# Patient Record
Sex: FEMALE | Race: WHITE | Employment: OTHER | RURAL
[De-identification: names, ages, dates, MRNs, and addresses within clinical notes are randomized per-mention and may not be internally consistent; named-entity substitution may affect disease eponyms.]

---

## 2017-01-10 ENCOUNTER — TELEPHONE (OUTPATIENT)
Dept: FAMILY MEDICINE CLINIC | Age: 67
End: 2017-01-10

## 2017-06-02 ENCOUNTER — OFFICE VISIT (OUTPATIENT)
Dept: FAMILY MEDICINE CLINIC | Age: 67
End: 2017-06-02

## 2017-06-02 VITALS
WEIGHT: 151.4 LBS | HEART RATE: 57 BPM | SYSTOLIC BLOOD PRESSURE: 102 MMHG | OXYGEN SATURATION: 98 % | TEMPERATURE: 97.5 F | RESPIRATION RATE: 16 BRPM | BODY MASS INDEX: 25 KG/M2 | DIASTOLIC BLOOD PRESSURE: 58 MMHG

## 2017-06-02 DIAGNOSIS — R39.15 URGENCY OF URINATION: ICD-10-CM

## 2017-06-02 DIAGNOSIS — R35.0 URINATION FREQUENCY: ICD-10-CM

## 2017-06-02 DIAGNOSIS — R31.9 URINARY TRACT INFECTION WITH HEMATURIA, SITE UNSPECIFIED: Primary | ICD-10-CM

## 2017-06-02 DIAGNOSIS — N39.0 URINARY TRACT INFECTION WITH HEMATURIA, SITE UNSPECIFIED: Primary | ICD-10-CM

## 2017-06-02 DIAGNOSIS — R30.0 DYSURIA: ICD-10-CM

## 2017-06-02 LAB
BILIRUB UR QL STRIP: NEGATIVE
GLUCOSE UR-MCNC: NEGATIVE MG/DL
KETONES P FAST UR STRIP-MCNC: NEGATIVE MG/DL
PH UR STRIP: 7 [PH] (ref 4.6–8)
PROT UR QL STRIP: NEGATIVE MG/DL
SP GR UR STRIP: 1.01 (ref 1–1.03)
UA UROBILINOGEN AMB POC: NORMAL (ref 0.2–1)
URINALYSIS CLARITY POC: CLEAR
URINALYSIS COLOR POC: NORMAL
URINE BLOOD POC: NORMAL
URINE LEUKOCYTES POC: NORMAL
URINE NITRITES POC: POSITIVE

## 2017-06-02 RX ORDER — SULFAMETHOXAZOLE AND TRIMETHOPRIM 800; 160 MG/1; MG/1
1 TABLET ORAL 2 TIMES DAILY
Qty: 10 TAB | Refills: 0 | Status: SHIPPED | OUTPATIENT
Start: 2017-06-02 | End: 2017-08-24 | Stop reason: SDUPTHER

## 2017-06-02 NOTE — PROGRESS NOTES
Chief Complaint   Patient presents with    Urinary Pain     urinary frequency, pain upon urination     Health Maintenance Due   Topic Date Due    DTaP/Tdap/Td series (1 - Tdap) 12/12/1970    FOBT Q 1 YEAR AGE 50-75  12/12/1999 2015 @ [facility]    ZOSTER VACCINE AGE 60>  12/12/2009 declines    GLAUCOMA SCREENING Q2Y  12/12/2014 sees Dr. Rocio Gutierrez     Blood pressure 102/58, pulse (!) 57, temperature 97.5 °F (36.4 °C), temperature source Temporal, resp. rate 16, weight 151 lb 6.4 oz (68.7 kg), SpO2 98 %.     Yue Horn LPN

## 2017-06-02 NOTE — PATIENT INSTRUCTIONS
Urinary Tract Infection in Women: Care Instructions  Your Care Instructions    A urinary tract infection, or UTI, is a general term for an infection anywhere between the kidneys and the urethra (where urine comes out). Most UTIs are bladder infections. They often cause pain or burning when you urinate. UTIs are caused by bacteria and can be cured with antibiotics. Be sure to complete your treatment so that the infection goes away. Follow-up care is a key part of your treatment and safety. Be sure to make and go to all appointments, and call your doctor if you are having problems. It's also a good idea to know your test results and keep a list of the medicines you take. How can you care for yourself at home? · Take your antibiotics as directed. Do not stop taking them just because you feel better. You need to take the full course of antibiotics. · Drink extra water and other fluids for the next day or two. This may help wash out the bacteria that are causing the infection. (If you have kidney, heart, or liver disease and have to limit fluids, talk with your doctor before you increase your fluid intake.)  · Avoid drinks that are carbonated or have caffeine. They can irritate the bladder. · Urinate often. Try to empty your bladder each time. · To relieve pain, take a hot bath or lay a heating pad set on low over your lower belly or genital area. Never go to sleep with a heating pad in place. To prevent UTIs  · Drink plenty of water each day. This helps you urinate often, which clears bacteria from your system. (If you have kidney, heart, or liver disease and have to limit fluids, talk with your doctor before you increase your fluid intake.)  · Urinate when you need to. · Urinate right after you have sex. · Change sanitary pads often. · Avoid douches, bubble baths, feminine hygiene sprays, and other feminine hygiene products that have deodorants.   · After going to the bathroom, wipe from front to back.  When should you call for help? Call your doctor now or seek immediate medical care if:  · Symptoms such as fever, chills, nausea, or vomiting get worse or appear for the first time. · You have new pain in your back just below your rib cage. This is called flank pain. · There is new blood or pus in your urine. · You have any problems with your antibiotic medicine. Watch closely for changes in your health, and be sure to contact your doctor if:  · You are not getting better after taking an antibiotic for 2 days. · Your symptoms go away but then come back. Where can you learn more? Go to http://darrion-gerson.info/. Enter N129 in the search box to learn more about \"Urinary Tract Infection in Women: Care Instructions. \"  Current as of: November 28, 2016  Content Version: 11.2  © 3916-4236 [employer], [employer] Care instructions adapted under license by [employer] (which disclaims liability or warranty for this information). If you have questions about a medical condition or this instruction, always ask your healthcare professional. [address] any warranty or liability for your use of this information.

## 2017-06-02 NOTE — PROGRESS NOTES
Tony Yañez is a 78 y.o. female who presents to the office today with the following:  Chief Complaint   Patient presents with    Urinary Pain     urinary frequency, pain upon urination       HPI  Concerned she has UTI. Says often gets in summer. Also horse back rides more in warm weather. No pain, \"feels like insides falling out\" and burns during urination. Constant urgency and frequency. No abnl color or odor to urine. Took AZO last night. Drinking more water. ROS  See HPI. Allergies   Allergen Reactions    Codeine Other (comments)     Extreme fatigue, very sleepy    Venom-Honey Bee Unknown (comments)       Current Outpatient Prescriptions   Medication Sig    trimethoprim-sulfamethoxazole (BACTRIM DS, SEPTRA DS) 160-800 mg per tablet Take 1 Tab by mouth two (2) times a day for 5 days.  flintstones complete (FLINTSTONES COMPLETE, IRON,) chewable tablet Take 1 Tab by mouth daily.  calcium-cholecalciferol, d3, (CALCIUM 600 + D) 600-125 mg-unit tab Take  by mouth two (2) times a day.  EPINEPHrine (EPIPEN 2-MIN) 0.3 mg/0.3 mL (1:1,000) injection 0.3 mL by IntraMUSCular route once as needed for up to 1 dose. No current facility-administered medications for this visit.         Past Medical History:   Diagnosis Date    GERD (gastroesophageal reflux disease)     Hiatal hernia     Hyperlipidemia     Hypothyroidism        Past Surgical History:   Procedure Laterality Date    ENDOSCOPY, COLON, DIAGNOSTIC      HX APPENDECTOMY      HX CATARACT REMOVAL  01/2017    right eye    HX COLONOSCOPY  9299,6956    q5y    HX ORTHOPAEDIC Right 2012    Rotator cuff    HX OTHER SURGICAL  1/16    stress echo    HX OTHER SURGICAL  07/08/2016    gastric sleeve    HX PO AND BSO         Social History     Social History    Marital status:      Spouse name: N/A    Number of children: N/A    Years of education: N/A     Social History Main Topics    Smoking status: Former Smoker     Quit date: 3/1/1993    Smokeless tobacco: Never Used    Alcohol use 0.0 oz/week     0 Standard drinks or equivalent per week      Comment: maybe once a month    Drug use: No    Sexual activity: Yes     Partners: Male     Other Topics Concern    None     Social History Narrative       Family History   Problem Relation Age of Onset    Cancer Mother      colon    Cancer Father      lungs and into brain    Hypertension Sister     Kidney Disease Sister     Heart Disease Brother          Physical Exam:  Visit Vitals    /58    Pulse (!) 57    Temp 97.5 °F (36.4 °C) (Temporal)    Resp 16    Wt 151 lb 6.4 oz (68.7 kg)    SpO2 98%    BMI 25 kg/m2     Has had full cardiac work up, admits HR often runs low. Physical Exam   Constitutional: She is oriented to person, place, and time and well-developed, well-nourished, and in no distress. HENT:   Head: Normocephalic and atraumatic. Eyes: Conjunctivae are normal.   Neck: Normal range of motion. Neck supple. Cardiovascular: Normal rate, regular rhythm and normal heart sounds. Pulmonary/Chest: Effort normal and breath sounds normal.   Abdominal: Soft. Normal appearance. She exhibits no distension and no mass. There is no hepatosplenomegaly. There is no tenderness. There is no rigidity, no rebound, no guarding, no CVA tenderness and no tenderness at McBurney's point. Lymphadenopathy:     She has no cervical adenopathy. Neurological: She is alert and oriented to person, place, and time. Gait normal.   Skin: Skin is warm and dry. Psychiatric: Mood and affect normal.   Nursing note and vitals reviewed. Assessment/Plan:    ICD-10-CM ICD-9-CM    1. Urinary tract infection with hematuria, site unspecified N39.0 599.0 trimethoprim-sulfamethoxazole (BACTRIM DS, SEPTRA DS) 160-800 mg per tablet    R31.9  CULTURE, URINE   2. Urgency of urination R39.15 788.63 AMB POC URINALYSIS DIP STICK AUTO W/O MICRO   3. Urination frequency R35.0 788.41    4.  Dysuria R30.0 788. 1      Results for orders placed or performed in visit on 06/02/17   AMB POC URINALYSIS DIP STICK AUTO W/O MICRO   Result Value Ref Range    Color (UA POC) Pavithra     Clarity (UA POC) Clear     Glucose (UA POC) Negative Negative    Bilirubin (UA POC) Negative Negative    Ketones (UA POC) Negative Negative    Specific gravity (UA POC) 1.015 1.001 - 1.035    Blood (UA POC) 1+ Negative    pH (UA POC) 7 4.6 - 8.0    Protein (UA POC) Negative Negative mg/dL    Urobilinogen (UA POC) 0.2 mg/dL 0.2 - 1    Nitrites (UA POC) Positive Negative    Leukocyte esterase (UA POC) 2+ Negative   Rec repeat in 2 weeks. Plans to f/u with PCP Dr. Nichole Zamarripa anyway- due for medicare wellness and had a few other chronic conditions to discuss. Instructed to push fluids (ie water/cranberry juice). Avoid caffeine and carbonated bvgs. Counseled on otc medications for sxs relief. Educational handout provided with home care instructions. Instructed to RTO/seek medical attn if sxs persist/worsen. Follow-up Disposition:  Return if symptoms worsen or fail to improve.     Mikey Pinto PA-C

## 2017-06-05 NOTE — PROGRESS NOTES
Notify pt urine culture confirms e.coli and susceptibility to the abx she was presribed, so should work well. Please continue tx plan and have rtc if sxs persist/worsen, if improved still rec repeat UA with Dr. Evelyn Castillo at f/u in couple weeks to ensure urine nl.

## 2017-06-26 ENCOUNTER — OFFICE VISIT (OUTPATIENT)
Dept: FAMILY MEDICINE CLINIC | Age: 67
End: 2017-06-26

## 2017-06-26 VITALS
DIASTOLIC BLOOD PRESSURE: 59 MMHG | RESPIRATION RATE: 18 BRPM | BODY MASS INDEX: 25 KG/M2 | OXYGEN SATURATION: 99 % | TEMPERATURE: 96 F | SYSTOLIC BLOOD PRESSURE: 96 MMHG | WEIGHT: 151.4 LBS | HEART RATE: 58 BPM

## 2017-06-26 DIAGNOSIS — Z13.31 SCREENING FOR DEPRESSION: ICD-10-CM

## 2017-06-26 DIAGNOSIS — N30.01 ACUTE CYSTITIS WITH HEMATURIA: ICD-10-CM

## 2017-06-26 DIAGNOSIS — Z00.00 MEDICARE ANNUAL WELLNESS VISIT, SUBSEQUENT: Primary | ICD-10-CM

## 2017-06-26 LAB
BILIRUB UR QL STRIP: NEGATIVE
GLUCOSE UR-MCNC: NEGATIVE MG/DL
KETONES P FAST UR STRIP-MCNC: NEGATIVE MG/DL
PH UR STRIP: 7 [PH] (ref 4.6–8)
PROT UR QL STRIP: NEGATIVE MG/DL
SP GR UR STRIP: 1.02 (ref 1–1.03)
UA UROBILINOGEN AMB POC: NORMAL (ref 0.2–1)
URINALYSIS CLARITY POC: CLEAR
URINALYSIS COLOR POC: NORMAL
URINE BLOOD POC: NEGATIVE
URINE LEUKOCYTES POC: NORMAL
URINE NITRITES POC: NEGATIVE

## 2017-06-26 NOTE — PATIENT INSTRUCTIONS
Schedule of Personalized Health Plan  (Provide Copy to Patient)  The best way to stay healthy is to live a healthy lifestyle. A healthy lifestyle includes regular exercise, eating a well-balanced diet, keeping a healthy weight and not smoking. Regular physical exams and screening tests are another important way to take care of yourself. Preventive exams provided by health care providers can find health problems early when treatment works best and can keep you from getting certain diseases or illnesses. Preventive services include exams, lab tests, screenings, shots, monitoring and information to help you take care of your own health. All people over 65 should have a pneumonia shot. Pneumonia shots are usually only needed once in a lifetime unless your doctor decides differently. All people over 65 should have a yearly flu shot. People over 65 are at medium to high risk for Hepatitis B. Three shots are needed for complete protection. In addition to your physical exam, some screening tests are recommended:    Bone mass measurement (dexa scan) is recommended every two years  Diabetes Mellitus screening is recommended every year. Glaucoma is an eye disease caused by high pressure in the eye. An eye exam is recommended every year. Cardiovascular screening tests that check your cholesterol and other blood fat (lipid) levels are recommended every five years. Colorectal Cancer screening tests help to find pre-cancerous polyps (growths in the colon) so they can be removed before they turn into cancer. Tests ordered for screening depend on your personal and family history risk factors.     Screening for Breast Cancer is recommended yearly with a mammogram.    Screening for Cervical Cancer is recommended every two years (annually for certain risk factors, such as previous history of STD or abnormal PAP in past 7 years), with a Pelvic Exam with PAP    Here is a list of your current Health Maintenance items with a due date:  Health Maintenance   Topic Date Due    DTaP/Tdap/Td series (1 - Tdap) 12/12/1970    FOBT Q 1 YEAR AGE 50-75  12/12/1999    ZOSTER VACCINE AGE 60>  12/12/2009    GLAUCOMA SCREENING Q2Y  12/12/2014    MEDICARE YEARLY EXAM  06/04/2017    INFLUENZA AGE 9 TO ADULT  08/01/2017    BREAST CANCER SCRN MAMMOGRAM  03/07/2018    Hepatitis C Screening  Completed    OSTEOPOROSIS SCREENING (DEXA)  Completed    Pneumococcal 65+ Low/Medium Risk  Completed

## 2017-06-26 NOTE — PROGRESS NOTES
Chief Complaint   Patient presents with   [facility] Wellness Visit     [address] 56 is a 78 y.o. female and presents for annual Medicare Wellness Visit. Problem List: Reviewed with patient and discussed risk factors. Patient Active Problem List   Diagnosis Code    Hyperlipidemia E78.5    Hypothyroidism E03.9    Hiatal hernia K44.9    GERD (gastroesophageal reflux disease) K21.9    Advance directive discussed with patient Z71.89       Current medical providers:  Patient Care Team:  Jim Mirza MD as PCP - General (Family Practice)    PSH: Reviewed with patient  Past Surgical History:   Procedure Laterality Date    ENDOSCOPY, COLON, DIAGNOSTIC      HX APPENDECTOMY      HX CATARACT REMOVAL  01/2017    right eye    HX COLONOSCOPY  4001,8289    q5y    HX ORTHOPAEDIC Right 2012    Rotator cuff    HX OTHER SURGICAL  1/16    stress echo    HX OTHER SURGICAL  07/08/2016    gastric sleeve    HX PO AND BSO          SH: Reviewed with patient  Social History   Substance Use Topics    Smoking status: Former Smoker     Quit date: 3/1/1993    Smokeless tobacco: Never Used    Alcohol use 0.0 oz/week     0 Standard drinks or equivalent per week      Comment: maybe once a month       FH: Reviewed with patient  Family History   Problem Relation Age of Onset    Cancer Mother      colon    Cancer Father      lungs and into brain    Hypertension Sister     Kidney Disease Sister     Heart Disease Brother     Cancer Son        Medications/Allergies: Reviewed with patient  Current Outpatient Prescriptions on File Prior to Visit   Medication Sig Dispense Refill    flintstones complete (FLINTSTONES COMPLETE, IRON,) chewable tablet Take 1 Tab by mouth daily.  calcium-cholecalciferol, d3, (CALCIUM 600 + D) 600-125 mg-unit tab Take  by mouth two (2) times a day.       EPINEPHrine (EPIPEN 2-MIN) 0.3 mg/0.3 mL (1:1,000) injection 0.3 mL by IntraMUSCular route once as needed for up to 1 dose. 1 Each prn    trimethoprim-sulfamethoxazole (BACTRIM DS, SEPTRA DS) 160-800 mg per tablet Take 1 Tab by mouth two (2) times a day for 5 days. 10 Tab 0     No current facility-administered medications on file prior to visit. Allergies   Allergen Reactions    Codeine Other (comments)     Extreme fatigue, very sleepy    Venom-Honey Bee Unknown (comments)       Objective:  Visit Vitals    BP 96/59 (BP 1 Location: Left arm, BP Patient Position: Sitting)    Pulse (!) 58    Temp 96 °F (35.6 °C) (Oral)    Resp 18    Wt 151 lb 6.4 oz (68.7 kg)    SpO2 99%    BMI 25 kg/m2    Body mass index is 25 kg/(m^2). Assessment of cognitive impairment: Alert and oriented x 3    Depression Screen:   PHQ over the last two weeks 6/26/2017   Little interest or pleasure in doing things Not at all   Feeling down, depressed or hopeless Not at all   Total Score PHQ 2 0       Fall Risk Assessment:    Fall Risk Assessment, last 12 mths 6/26/2017   Able to walk? Yes   Fall in past 12 months? No       Functional Ability:   Does the patient exhibit a steady gait? yes   How long did it take the patient to get up and walk from a sitting position? 2sec   Is the patient self reliant?  (ie can do own laundry, meals, household chores)  yes     Does the patient handle his/her own medications? yes     Does the patient handle his/her own money? yes     Is the patients home safe (ie good lighting, handrails on stairs and bath, etc.)? yes     Did you notice or did patient express any hearing difficulties? yes     Did you notice or did patient express any vision difficulties?   no     Were distance and reading eye charts used? no       Advance Care Planning:   Patient was offered the opportunity to discuss advance care planning:  yes     Does patient have an Advance Directive:  no   If no, did you provide information on Caring Connections?   yes       Plan:      No orders of the defined types were placed in this encounter. Health Maintenance   Topic Date Due    DTaP/Tdap/Td series (1 - Tdap) 12/12/1970    FOBT Q 1 YEAR AGE 50-75  12/12/1999 carmen donald every 5 years    ZOSTER VACCINE AGE 60>  12/12/2009    GLAUCOMA SCREENING Q2Y  12/12/2014 sees Dr. Michelle Anderson  06/04/2017 today    INFLUENZA AGE 9 TO ADULT  08/01/2017    BREAST CANCER SCRN MAMMOGRAM  03/07/2018    Hepatitis C Screening  Completed    OSTEOPOROSIS SCREENING (DEXA)  Completed    Pneumococcal 65+ Low/Medium Risk  Completed       *Patient verbalized understanding and agreement with the plan. A copy of the After Visit Summary with personalized health plan was given to the patient today.       Cierra Colbert LPN

## 2017-06-26 NOTE — PROGRESS NOTES
HISTORY OF PRESENT ILLNESS  Abhijit Henriquez is a 78 y.o. female. Chief Complaint   Patient presents with   [facility] Annual Wellness Visit            HPI  Had been on Celexa for Depression  Came off all meds after surgery  occ anxious when on road or get on a horse    HM reviewed    Review of Systems   Constitutional: Negative for weight loss. HENT: Negative for congestion. Eyes: Negative for blurred vision. Respiratory: Negative for cough. Cardiovascular: Negative for chest pain, palpitations and leg swelling. Gastrointestinal: Negative for abdominal pain, blood in stool and melena. Genitourinary: Negative for dysuria, frequency, hematuria and urgency. Musculoskeletal: Negative for joint pain and myalgias. Skin: Negative. Neurological: Negative for dizziness and headaches. Endo/Heme/Allergies: Negative for polydipsia. Does not bruise/bleed easily. Psychiatric/Behavioral: Negative for depression. The patient is nervous/anxious. Past Medical History:   Diagnosis Date    GERD (gastroesophageal reflux disease)     Hiatal hernia     resolved    Hyperlipidemia     resolved    Hypothyroidism     resolved     Current Outpatient Prescriptions   Medication Sig Dispense Refill    flintstones complete (FLINTSTONES COMPLETE, IRON,) chewable tablet Take 1 Tab by mouth daily.  calcium-cholecalciferol, d3, (CALCIUM 600 + D) 600-125 mg-unit tab Take  by mouth two (2) times a day.  EPINEPHrine (EPIPEN 2-MIN) 0.3 mg/0.3 mL (1:1,000) injection 0.3 mL by IntraMUSCular route once as needed for up to 1 dose. 1 Each prn    trimethoprim-sulfamethoxazole (BACTRIM DS, SEPTRA DS) 160-800 mg per tablet Take 1 Tab by mouth two (2) times a day for 5 days.  10 Tab 0     Allergies   Allergen Reactions    Codeine Other (comments)     Extreme fatigue, very sleepy    Venom-Honey Bee Unknown (comments)     Visit Vitals    BP 96/59 (BP 1 Location: Left arm, BP Patient Position: Sitting)    Pulse (!) 58  Temp 96 °F (35.6 °C) (Oral)    Resp 18    Wt 151 lb 6.4 oz (68.7 kg)    SpO2 99%    BMI 25 kg/m2     Physical Exam   Constitutional: She is oriented to person, place, and time. She appears well-developed and well-nourished. No distress. HENT:   Head: Normocephalic and atraumatic. Right Ear: External ear normal.   Left Ear: External ear normal.   Mouth/Throat: No oropharyngeal exudate. Eyes: Conjunctivae and EOM are normal. Pupils are equal, round, and reactive to light. Cardiovascular: Normal rate, regular rhythm and normal heart sounds. Pulmonary/Chest: Effort normal and breath sounds normal.   Abdominal: Soft. She exhibits no distension. There is no tenderness. Musculoskeletal: She exhibits no edema. Lymphadenopathy:     She has no cervical adenopathy. Neurological: She is alert and oriented to person, place, and time. Skin: Skin is warm and dry. Psychiatric: She has a normal mood and affect. Nursing note and vitals reviewed. Recent Results (from the past 12 hour(s))   AMB POC URINALYSIS DIP STICK MANUAL W/O MICRO    Collection Time: 06/26/17  9:40 AM   Result Value Ref Range    Color (UA POC) Dark Yellow     Clarity (UA POC) Clear     Glucose (UA POC) Negative Negative    Bilirubin (UA POC) Negative Negative    Ketones (UA POC) Negative Negative    Specific gravity (UA POC) 1.020 1.001 - 1.035    Blood (UA POC) Negative Negative    pH (UA POC) 7.0 4.6 - 8.0    Protein (UA POC) Negative Negative mg/dL    Urobilinogen (UA POC) 0.2 mg/dL 0.2 - 1    Nitrites (UA POC) Negative Negative    Leukocyte esterase (UA POC) Trace Negative       ASSESSMENT and PLAN    ICD-10-CM ICD-9-CM    1. Medicare annual wellness visit, subsequent Z00.00 V70.0    2. Screening for depression Z13.89 V79.0 [place] DEPRESSION SCREEN ANNUAL   3.  Acute cystitis with hematuria N30.01 595.0 AMB POC URINALYSIS DIP STICK MANUAL W/O MICRO  Hematuria resolved

## 2017-06-26 NOTE — MR AVS SNAPSHOT
Visit Information Date & Time Provider Department Dept. Phone Encounter #  
 6/26/2017  8:20 AM Arthur Cortes  [facility] [phone] [id number] Your Appointments 7/10/2018  8:00 AM  
Medicare Physical with Arthur Cortes MD  
[address] [facility] ([facility]) Appt Note: ,CP$0/6.26.2017  
 [address] [phone]  
[phone]  
  
   
 [address] [address] Upcoming Health Maintenance Date Due COLONOSCOPY 12/12/1967 GLAUCOMA SCREENING Q2Y 12/12/2014 MEDICARE YEARLY EXAM 6/4/2017 INFLUENZA AGE 9 TO ADULT 8/1/2017 BREAST CANCER SCRN MAMMOGRAM 3/7/2018 DTaP/Tdap/Td series (2 - Td) 6/26/2027 Allergies as of 6/26/2017  Review Complete On: 6/26/2017 By: Renny Belcher LPN Severity Noted Reaction Type Reactions Codeine  06/03/2016    Other (comments) Extreme fatigue, very sleepy Venom-honey Bee  08/20/2013   Not Verified Unknown (comments) Current Immunizations  Reviewed on 12/27/2016 Name Date Influenza High Dose Vaccine PF 12/27/2016 Influenza Vaccine 10/14/2014, 1/8/2014 Pneumococcal Conjugate (PCV-13) 6/3/2016 Pneumococcal Polysaccharide (PPSV-23) 5/11/2015 Tetanus Toxoid, Adsorbed 5/11/2015 Not reviewed this visit You Were Diagnosed With   
  
 Codes Comments Medicare annual wellness visit, subsequent    -  Primary ICD-10-CM: Z00.00 ICD-9-CM: V70.0 Screening for depression     ICD-10-CM: Z13.89 ICD-9-CM: V79.0 Acute cystitis with hematuria     ICD-10-CM: N30.01 
ICD-9-CM: 595.0 Vitals BP Pulse Temp Resp Weight(growth percentile) SpO2  
 96/59 (BP 1 Location: Left arm, BP Patient Position: Sitting) (!) 58 96 °F (35.6 °C) (Oral) 18 151 lb 6.4 oz (68.7 kg) 99% BMI OB Status Smoking Status 25 kg/m2 Hysterectomy Former Smoker BMI and BSA Data Body Mass Index Body Surface Area  
 25 kg/m 2 1.78 m 2 Preferred Pharmacy Pharmacy Name Phone [facility], [address] [place] [address] Your Updated Medication List  
  
   
This list is accurate as of: 6/26/17  9:55 AM.  Always use your most recent med list.  
  
  
  
  
 CALCIUM 600 + D 600-125 mg-unit Tab Generic drug:  calcium-cholecalciferol (d3) Take  by mouth two (2) times a day. EPINEPHrine 0.3 mg/0.3 mL injection Commonly known as:  EPIPEN 2-MIN  
0.3 mL by IntraMUSCular route once as needed for up to 1 dose. FLINTSTONES COMPLETE (IRON) chewable tablet Generic drug:  flintstones complete Take 1 Tab by mouth daily. trimethoprim-sulfamethoxazole 160-800 mg per tablet Commonly known as:  BACTRIM DS, SEPTRA DS Take 1 Tab by mouth two (2) times a day for 5 days. We Performed the Following AMB POC URINALYSIS DIP STICK MANUAL W/O MICRO [13153 CPT(R)] [id number] [place] [address] [employer] [ [facility]] Patient Instructions Schedule of Personalized Health Plan (Provide Copy to Patient) The best way to stay healthy is to live a healthy lifestyle. A healthy lifestyle includes regular exercise, eating a well-balanced diet, keeping a healthy weight and not smoking. Regular physical exams and screening tests are another important way to take care of yourself. Preventive exams provided by health care providers can find health problems early when treatment works best and can keep you from getting certain diseases or illnesses. Preventive services include exams, lab tests, screenings, shots, monitoring and information to help you take care of your own health. All people over 65 should have a pneumonia shot. Pneumonia shots are usually only needed once in a lifetime unless your doctor decides differently. All people over 65 should have a yearly flu shot. People over 65 are at medium to high risk for Hepatitis B. Three shots are needed for complete protection. In addition to your physical exam, some screening tests are recommended: 
 
Bone mass measurement (dexa scan) is recommended every two years Diabetes Mellitus screening is recommended every year. Glaucoma is an eye disease caused by high pressure in the eye. An eye exam is recommended every year. Cardiovascular screening tests that check your cholesterol and other blood fat (lipid) levels are recommended every five years. Colorectal Cancer screening tests help to find pre-cancerous polyps (growths in the colon) so they can be removed before they turn into cancer. Tests ordered for screening depend on your personal and family history risk factors. Screening for Breast Cancer is recommended yearly with a mammogram. 
 
Screening for Cervical Cancer is recommended every two years (annually for certain risk factors, such as previous history of STD or abnormal PAP in past 7 years), with a Pelvic Exam with PAP Here is a list of your current Health Maintenance items with a due date: 
Health Maintenance Topic Date Due  
 DTaP/Tdap/Td series (1 - Tdap) 12/12/1970  
 FOBT Q 1 YEAR AGE 50-75  12/12/1999  ZOSTER VACCINE AGE 60>  12/12/2009  GLAUCOMA SCREENING Q2Y  12/12/2014  MEDICARE YEARLY EXAM  06/04/2017  INFLUENZA AGE 9 TO ADULT  08/01/2017  BREAST CANCER SCRN MAMMOGRAM  03/07/2018  Hepatitis C Screening  Completed  OSTEOPOROSIS SCREENING (DEXA)  Completed  Pneumococcal 65+ Low/Medium Risk  Completed Introducing [facility]! King Marija introduces [employer] patient portal. Now you can access parts of your medical record, email your doctor's office, and request medication refills online. 1. In your internet browser, go to [email or web address] 2. Click on the First Time User? Click Here link in the Sign In box.  You will see the New Member Sign Up page. 3. Enter your [employer] Access Code exactly as it appears below. You will not need to use this code after youve completed the sign-up process. If you do not sign up before the expiration date, you must request a new code. · [employer] Access Code: [email or web address] Expires: 8/31/2017 10:59 AM 
 
4. Enter the last four digits of your Social Security Number (xxxx) and Date of Birth (mm/dd/yyyy) as indicated and click Submit. You will be taken to the next sign-up page. 5. Create a [employer] ID. This will be your [employer] login ID and cannot be changed, so think of one that is secure and easy to remember. 6. Create a [employer] password. You can change your password at any time. 7. Enter your Password Reset Question and Answer. This can be used at a later time if you forget your password. 8. Enter your e-mail address. You will receive e-mail notification when new information is available in [address] 9. Click Sign Up. You can now view and download portions of your medical record. 10. Click the Download Summary menu link to download a portable copy of your medical information. If you have questions, please visit the Frequently Asked Questions section of the [employer] website. Remember, [employer] is NOT to be used for urgent needs. For medical emergencies, dial 911. Now available from your iPhone and Android! Please provide this summary of care documentation to your next provider. Your primary care clinician is listed as Makeda Garay. If you have any questions after today's visit, please call [phone].

## 2017-06-26 NOTE — ACP (ADVANCE CARE PLANNING)
Advance Care Planning (ACP) Provider Conversation Snapshot    Date of ACP Conversation: 06/26/17  Persons included in Conversation:  patient  Length of ACP Conversation in minutes:  <16 minutes (Non-Billable)    HO given and pt will review and bring in a copy

## 2017-07-13 LAB — BACTERIA UR CULT: ABNORMAL

## 2017-08-24 ENCOUNTER — OFFICE VISIT (OUTPATIENT)
Dept: FAMILY MEDICINE CLINIC | Age: 67
End: 2017-08-24

## 2017-08-24 VITALS
SYSTOLIC BLOOD PRESSURE: 104 MMHG | TEMPERATURE: 97 F | OXYGEN SATURATION: 96 % | DIASTOLIC BLOOD PRESSURE: 58 MMHG | WEIGHT: 154 LBS | RESPIRATION RATE: 16 BRPM | HEART RATE: 55 BPM | BODY MASS INDEX: 25.66 KG/M2 | HEIGHT: 65 IN

## 2017-08-24 DIAGNOSIS — S69.92XA FINGER INJURY, LEFT, INITIAL ENCOUNTER: ICD-10-CM

## 2017-08-24 DIAGNOSIS — N30.01 ACUTE CYSTITIS WITH HEMATURIA: Primary | ICD-10-CM

## 2017-08-24 DIAGNOSIS — R30.0 DYSURIA: ICD-10-CM

## 2017-08-24 LAB
BILIRUB UR QL STRIP: NEGATIVE
GLUCOSE UR-MCNC: NEGATIVE MG/DL
KETONES P FAST UR STRIP-MCNC: NEGATIVE MG/DL
PH UR STRIP: 6.5 [PH] (ref 4.6–8)
PROT UR QL STRIP: NORMAL MG/DL
SP GR UR STRIP: 1.02 (ref 1–1.03)
UA UROBILINOGEN AMB POC: NORMAL (ref 0.2–1)
URINALYSIS CLARITY POC: NORMAL
URINALYSIS COLOR POC: YELLOW
URINE BLOOD POC: NORMAL
URINE LEUKOCYTES POC: NORMAL
URINE NITRITES POC: POSITIVE

## 2017-08-24 RX ORDER — SULFAMETHOXAZOLE AND TRIMETHOPRIM 800; 160 MG/1; MG/1
1 TABLET ORAL 2 TIMES DAILY
Qty: 10 TAB | Refills: 0 | Status: SHIPPED | OUTPATIENT
Start: 2017-08-24 | End: 2018-06-27 | Stop reason: SDUPTHER

## 2017-08-24 NOTE — PROGRESS NOTES
HISTORY OF PRESENT ILLNESS  Hayley Mccormack is a 78 y.o. female. Chief Complaint   Patient presents with    Dysuria     urine frequency       HPI  Started 2 d ago  Drawing feeling with urination  Frequency and urgency  Tried AZO  Helped a little    Stubbed left ring finger 2 d ago  And swollen  Can move finger  Pain over prox middle phalanx    Review of Systems   Constitutional: Negative for fever. Gastrointestinal: Negative for abdominal pain. Genitourinary: Positive for dysuria, frequency and urgency. Negative for flank pain and hematuria. Musculoskeletal: Negative for back pain. Past Medical History:   Diagnosis Date    GERD (gastroesophageal reflux disease)     Hiatal hernia     resolved    Hyperlipidemia     resolved    Hypothyroidism     resolved     Current Outpatient Prescriptions   Medication Sig Dispense Refill    trimethoprim-sulfamethoxazole (BACTRIM DS, SEPTRA DS) 160-800 mg per tablet Take 1 Tab by mouth two (2) times a day for 5 days. 10 Tab 0    flintstones complete (FLINTSTONES COMPLETE, IRON,) chewable tablet Take 1 Tab by mouth daily.  calcium-cholecalciferol, d3, (CALCIUM 600 + D) 600-125 mg-unit tab Take  by mouth two (2) times a day.  EPINEPHrine (EPIPEN 2-MIN) 0.3 mg/0.3 mL (1:1,000) injection 0.3 mL by IntraMUSCular route once as needed for up to 1 dose. 1 Each prn     Allergies   Allergen Reactions    Codeine Other (comments)     Extreme fatigue, very sleepy    Venom-Honey Bee Unknown (comments)     Visit Vitals    /58 (BP 1 Location: Right arm, BP Patient Position: Sitting)    Pulse (!) 55    Temp 97 °F (36.1 °C) (Oral)    Resp 16    Ht 5' 5.25\" (1.657 m)    Wt 154 lb (69.9 kg)    SpO2 96%    BMI 25.43 kg/m2     Physical Exam   Constitutional: She is oriented to person, place, and time. She appears well-developed and well-nourished. No distress. HENT:   Head: Normocephalic and atraumatic.    Eyes: Conjunctivae and EOM are normal. Cardiovascular: Normal rate and regular rhythm. Pulmonary/Chest: Effort normal and breath sounds normal.   Abdominal: Soft. Bowel sounds are normal.   Musculoskeletal: Normal range of motion. She exhibits no edema. Left ring finger swollen over proximal phalanx, mildly tender over prox middle phalanx, ring in place   Neurological: She is alert and oriented to person, place, and time. Skin: Skin is warm and dry. Psychiatric: She has a normal mood and affect. Nursing note and vitals reviewed. Recent Results (from the past 12 hour(s))   AMB POC URINALYSIS DIP STICK MANUAL W/O MICRO    Collection Time: 08/24/17  8:23 AM   Result Value Ref Range    Color (UA POC) Yellow     Clarity (UA POC) Cloudy     Glucose (UA POC) Negative Negative    Bilirubin (UA POC) Negative Negative    Ketones (UA POC) Negative Negative    Specific gravity (UA POC) 1.020 1.001 - 1.035    Blood (UA POC) 2+ Negative    pH (UA POC) 6.5 4.6 - 8.0    Protein (UA POC) 2+ Negative mg/dL    Urobilinogen (UA POC) 0.2 mg/dL 0.2 - 1    Nitrites (UA POC) Positive Negative    Leukocyte esterase (UA POC) 3+ Negative       ASSESSMENT and PLAN    ICD-10-CM ICD-9-CM    1. Acute cystitis with hematuria N30.01 595.0 CULTURE, URINE      trimethoprim-sulfamethoxazole (BACTRIM DS, SEPTRA DS) 160-800 mg per tablet   2. Dysuria R30.0 788.1 AMB POC URINALYSIS DIP STICK MANUAL W/O MICRO   3. Finger injury, left, initial encounter S69. 92XA 959.5 XR 4TH FINGER LT MIN 2 V   advised her to cool finger and try to get ring off or cut it off

## 2017-08-24 NOTE — PROGRESS NOTES
1. Have you been to the ER, urgent care clinic since your last visit? Hospitalized since your last visit? Saw [facility] provider first part of August,2017.    2. Have you seen or consulted any other health care providers outside of the [address] [place] [address] since your last visit? Include any pap smears or colon screening.  No

## 2017-08-27 LAB — BACTERIA UR CULT: ABNORMAL

## 2017-08-28 NOTE — PROGRESS NOTES
I have called and advised patient of lab results per Dr Billy Lo review. Patient verbalizes understanding.   Onesimo Pickering RN

## 2018-02-23 ENCOUNTER — OFFICE VISIT (OUTPATIENT)
Dept: FAMILY MEDICINE CLINIC | Age: 68
End: 2018-02-23

## 2018-02-23 VITALS
HEART RATE: 60 BPM | DIASTOLIC BLOOD PRESSURE: 59 MMHG | WEIGHT: 163.4 LBS | RESPIRATION RATE: 16 BRPM | OXYGEN SATURATION: 98 % | SYSTOLIC BLOOD PRESSURE: 102 MMHG | TEMPERATURE: 96 F | BODY MASS INDEX: 26.98 KG/M2

## 2018-02-23 DIAGNOSIS — F33.8 SEASONAL DEPRESSION (HCC): ICD-10-CM

## 2018-02-23 DIAGNOSIS — F41.9 ANXIETY: Primary | ICD-10-CM

## 2018-02-23 RX ORDER — CITALOPRAM 20 MG/1
20 TABLET, FILM COATED ORAL DAILY
Qty: 90 TAB | Refills: 1 | Status: SHIPPED | OUTPATIENT
Start: 2018-02-23 | End: 2018-07-26 | Stop reason: SDUPTHER

## 2018-02-23 RX ORDER — ACETAMINOPHEN 500 MG
TABLET ORAL 2 TIMES DAILY
COMMUNITY
End: 2018-06-27 | Stop reason: ALTCHOICE

## 2018-02-23 NOTE — PROGRESS NOTES
Chief Complaint   Patient presents with    Anxiety     pt has been off of celexa for 2 years, pt. c/o having panick attacks/anxiety approximately 6 months; pt wants to discuss medication     Health Maintenance Due   Topic Date Due    Influenza Age 5 to Adult  08/01/2017    BREAST CANCER SCRN MAMMOGRAM  03/07/2018     Visit Vitals    /59 (BP 1 Location: Left arm, BP Patient Position: Sitting)    Pulse 60    Temp 96 °F (35.6 °C) (Oral)    Resp 16    Wt 163 lb 6.4 oz (74.1 kg)    SpO2 98%    BMI 26.98 kg/m2     Umesh Murrieta LPN

## 2018-02-23 NOTE — PROGRESS NOTES
HISTORY OF PRESENT ILLNESS  Gerry Weller is a 75 y.o. female. Chief Complaint   Patient presents with    Anxiety     pt has been off of celexa for 2 years, pt. c/o having panick attacks/anxiety approximately 6 months; pt wants to discuss medication       HPI  Before surgery was on Celexa 20  Then came off and was doing well  In fall was getting more anxious  Worse with driving and riding  Once doing it was ok  1 mo ago worse  Anxious about going to new places  Tight up in knots when has to get going  Sleeps ok  Gained some weight  Eating comfort foods    No SE with Celexa  Helping in the past    Review of Systems   HENT: Negative for congestion. Respiratory: Negative for cough. Cardiovascular: Negative for chest pain and palpitations. Neurological: Negative for dizziness and headaches. Psychiatric/Behavioral: Positive for depression. Negative for hallucinations, substance abuse and suicidal ideas. The patient is nervous/anxious. The patient does not have insomnia. Past Medical History:   Diagnosis Date    GERD (gastroesophageal reflux disease)     Hiatal hernia     resolved    Hyperlipidemia     resolved    Hypothyroidism     resolved     Current Outpatient Prescriptions   Medication Sig Dispense Refill    Cholecalciferol, Vitamin D3, (VITAMIN D3) 2,000 unit cap capsule Take  by mouth two (2) times a day.  citalopram (CELEXA) 20 mg tablet Take 1 Tab by mouth daily. 90 Tab 1    flintstones complete (FLINTSTONES COMPLETE, IRON,) chewable tablet Take 1 Tab by mouth daily.  calcium-cholecalciferol, d3, (CALCIUM 600 + D) 600-125 mg-unit tab Take  by mouth two (2) times a day.  EPINEPHrine (EPIPEN 2-MIN) 0.3 mg/0.3 mL (1:1,000) injection 0.3 mL by IntraMUSCular route once as needed for up to 1 dose. 1 Each prn    trimethoprim-sulfamethoxazole (BACTRIM DS, SEPTRA DS) 160-800 mg per tablet Take 1 Tab by mouth two (2) times a day for 5 days.  10 Tab 0     Allergies   Allergen Reactions    Venom-Honey Bee Swelling and Unknown (comments)    Codeine Other (comments)     Extreme fatigue, very sleepy     Visit Vitals    /59 (BP 1 Location: Left arm, BP Patient Position: Sitting)    Pulse 60    Temp 96 °F (35.6 °C) (Oral)    Resp 16    Wt 163 lb 6.4 oz (74.1 kg)    SpO2 98%    BMI 26.98 kg/m2     Physical Exam   Constitutional: She is oriented to person, place, and time. She appears well-developed and well-nourished. No distress. HENT:   Head: Normocephalic and atraumatic. Right Ear: External ear normal.   Left Ear: External ear normal.   Mouth/Throat: Oropharynx is clear and moist. No oropharyngeal exudate. Eyes: Conjunctivae and EOM are normal. Pupils are equal, round, and reactive to light. Cardiovascular: Normal rate and regular rhythm. Pulmonary/Chest: Effort normal and breath sounds normal.   Musculoskeletal: She exhibits no edema. Lymphadenopathy:     She has no cervical adenopathy. Neurological: She is alert and oriented to person, place, and time. Skin: Skin is warm and dry. Psychiatric: She has a normal mood and affect. Nursing note and vitals reviewed. ASSESSMENT and PLAN    ICD-10-CM ICD-9-CM    1. Anxiety F41.9 300.00 citalopram (CELEXA) 20 mg tablet      METABOLIC PANEL, COMPREHENSIVE      CBC WITH AUTOMATED DIFF      TSH 3RD GENERATION   2.  Seasonal depression (HCC) F33.9 296.99 citalopram (CELEXA) 20 mg tablet      METABOLIC PANEL, COMPREHENSIVE      CBC WITH AUTOMATED DIFF      TSH 3RD GENERATION

## 2018-02-24 LAB
ALBUMIN SERPL-MCNC: 4 G/DL (ref 3.6–4.8)
ALBUMIN/GLOB SERPL: 1.5 {RATIO} (ref 1.2–2.2)
ALP SERPL-CCNC: 83 IU/L (ref 39–117)
ALT SERPL-CCNC: 18 IU/L (ref 0–32)
AST SERPL-CCNC: 22 IU/L (ref 0–40)
BASOPHILS # BLD AUTO: 0 X10E3/UL (ref 0–0.2)
BASOPHILS NFR BLD AUTO: 1 %
BILIRUB SERPL-MCNC: <0.2 MG/DL (ref 0–1.2)
BUN SERPL-MCNC: 21 MG/DL (ref 8–27)
BUN/CREAT SERPL: 24 (ref 12–28)
CALCIUM SERPL-MCNC: 9 MG/DL (ref 8.7–10.3)
CHLORIDE SERPL-SCNC: 98 MMOL/L (ref 96–106)
CO2 SERPL-SCNC: 26 MMOL/L (ref 18–29)
CREAT SERPL-MCNC: 0.88 MG/DL (ref 0.57–1)
EOSINOPHIL # BLD AUTO: 0.2 X10E3/UL (ref 0–0.4)
EOSINOPHIL NFR BLD AUTO: 3 %
ERYTHROCYTE [DISTWIDTH] IN BLOOD BY AUTOMATED COUNT: 13.7 % (ref 12.3–15.4)
GFR SERPLBLD CREATININE-BSD FMLA CKD-EPI: 68 ML/MIN/1.73
GFR SERPLBLD CREATININE-BSD FMLA CKD-EPI: 78 ML/MIN/1.73
GLOBULIN SER CALC-MCNC: 2.6 G/DL (ref 1.5–4.5)
GLUCOSE SERPL-MCNC: 69 MG/DL (ref 65–99)
HCT VFR BLD AUTO: 38.6 % (ref 34–46.6)
HGB BLD-MCNC: 13 G/DL (ref 11.1–15.9)
IMM GRANULOCYTES # BLD: 0 X10E3/UL (ref 0–0.1)
IMM GRANULOCYTES NFR BLD: 0 %
LYMPHOCYTES # BLD AUTO: 2.4 X10E3/UL (ref 0.7–3.1)
LYMPHOCYTES NFR BLD AUTO: 33 %
MCH RBC QN AUTO: 30.2 PG (ref 26.6–33)
MCHC RBC AUTO-ENTMCNC: 33.7 G/DL (ref 31.5–35.7)
MCV RBC AUTO: 90 FL (ref 79–97)
MONOCYTES # BLD AUTO: 0.6 X10E3/UL (ref 0.1–0.9)
MONOCYTES NFR BLD AUTO: 8 %
NEUTROPHILS # BLD AUTO: 4 X10E3/UL (ref 1.4–7)
NEUTROPHILS NFR BLD AUTO: 55 %
PLATELET # BLD AUTO: 270 X10E3/UL (ref 150–379)
POTASSIUM SERPL-SCNC: 4.1 MMOL/L (ref 3.5–5.2)
PROT SERPL-MCNC: 6.6 G/DL (ref 6–8.5)
RBC # BLD AUTO: 4.3 X10E6/UL (ref 3.77–5.28)
SODIUM SERPL-SCNC: 140 MMOL/L (ref 134–144)
TSH SERPL DL<=0.005 MIU/L-ACNC: 2.34 UIU/ML (ref 0.45–4.5)
WBC # BLD AUTO: 7.2 X10E3/UL (ref 3.4–10.8)

## 2018-02-26 NOTE — PROGRESS NOTES
Call pt, the sugar, kidney and liver tests are normal  The CBC is normal  The thyroid test is normal

## 2018-06-27 ENCOUNTER — OFFICE VISIT (OUTPATIENT)
Dept: FAMILY MEDICINE CLINIC | Age: 68
End: 2018-06-27

## 2018-06-27 VITALS
HEIGHT: 65 IN | DIASTOLIC BLOOD PRESSURE: 56 MMHG | RESPIRATION RATE: 16 BRPM | HEART RATE: 56 BPM | TEMPERATURE: 96.9 F | WEIGHT: 166.2 LBS | SYSTOLIC BLOOD PRESSURE: 107 MMHG | OXYGEN SATURATION: 98 % | BODY MASS INDEX: 27.69 KG/M2

## 2018-06-27 DIAGNOSIS — N30.00 ACUTE CYSTITIS WITHOUT HEMATURIA: ICD-10-CM

## 2018-06-27 DIAGNOSIS — R30.9 URINARY PAIN: Primary | ICD-10-CM

## 2018-06-27 LAB
BILIRUB UR QL STRIP: NEGATIVE
GLUCOSE UR-MCNC: NEGATIVE MG/DL
KETONES P FAST UR STRIP-MCNC: NEGATIVE MG/DL
PH UR STRIP: 5.5 [PH] (ref 4.6–8)
PROT UR QL STRIP: NEGATIVE
SP GR UR STRIP: 1.01 (ref 1–1.03)
UA UROBILINOGEN AMB POC: NORMAL (ref 0.2–1)
URINALYSIS CLARITY POC: NORMAL
URINALYSIS COLOR POC: YELLOW
URINE BLOOD POC: NORMAL
URINE LEUKOCYTES POC: NORMAL
URINE NITRITES POC: NEGATIVE

## 2018-06-27 RX ORDER — SULFAMETHOXAZOLE AND TRIMETHOPRIM 800; 160 MG/1; MG/1
1 TABLET ORAL 2 TIMES DAILY
Qty: 10 TAB | Refills: 0 | Status: SHIPPED | OUTPATIENT
Start: 2018-06-27 | End: 2019-08-12 | Stop reason: DRUGHIGH

## 2018-06-27 NOTE — MR AVS SNAPSHOT
[facility] 6 
[phone] Patient: Prem Patel MRN: [id number] Visit Information Date & Time Provider Department Dept. Phone Encounter #  
 2018  2:40 PM Keely Fontenot MD [address] [place] [address] [phone] [id number] Follow-up Instructions Return if symptoms worsen or fail to improve. Your Appointments 7/10/2018  8:00 AM  
Medicare Physical with Krystyna Nicolas MD  
[address] [place] [address] (Kathy Erasmo) Appt Note: ,CP$0/2017  
 [address] [phone]  
[phone]  
  
   
 [address] [address] Upcoming Health Maintenance Date Due  
 BREAST CANCER SCRN MAMMOGRAM 3/7/2018 MEDICARE YEARLY EXAM 2018 Influenza Age 5 to Adult 2018 GLAUCOMA SCREENING Q2Y 2019 COLONOSCOPY 11/10/2019 DTaP/Tdap/Td series (2 - Td) 2027 Allergies as of 2018  Review Complete On: 2018 By: Ofelia Sr RN Severity Noted Reaction Type Reactions Venom-honey Bee High 2013   Not Verified Swelling, Unknown (comments) Codeine  2016    Other (comments) Extreme fatigue, very sleepy Current Immunizations  Reviewed on 2016 Name Date Influenza High Dose Vaccine PF 2016 Influenza Vaccine 10/14/2014, 2014 Pneumococcal Conjugate (PCV-13) 6/3/2016 Pneumococcal Polysaccharide (PPSV-23) 2015 Tetanus Toxoid, Adsorbed 2015 Not reviewed this visit You Were Diagnosed With   
  
 Codes Comments Urinary pain    -  Primary ICD-10-CM: R30.9 ICD-9-CM: 788.1 Urinary tract infection with hematuria, site unspecified     ICD-10-CM: N39.0, R31.9 ICD-9-CM: 599.0 Acute cystitis with hematuria     ICD-10-CM: N30.01 
ICD-9-CM: 595.0 Vitals BP Pulse Temp Resp Height(growth percentile) Weight(growth percentile) 107/56 (BP 1 Location: Left arm, BP Patient Position: Sitting) (!) 56 96.9 °F (36.1 °C) (Oral) 16 5' 5\" (1.651 m) 166 lb 3.2 oz (75.4 kg) SpO2 BMI OB Status Smoking Status 98% 27.66 kg/m2 Hysterectomy Former Smoker BMI and BSA Data Body Mass Index Body Surface Area  
 27.66 kg/m 2 1.86 m 2 Preferred Pharmacy Pharmacy Name Phone [facility] [address], [address] [place] [address] Your Updated Medication List  
  
   
This list is accurate as of 6/27/18  3:22 PM.  Always use your most recent med list.  
  
  
  
  
 CALCIUM 600 + D 600-125 mg-unit Tab Generic drug:  calcium-cholecalciferol (d3) Take  by mouth two (2) times a day. citalopram 20 mg tablet Commonly known as:  Cloyce Maria Take 1 Tab by mouth daily. EPINEPHrine 0.3 mg/0.3 mL injection Commonly known as:  EPIPEN 2-MIN  
0.3 mL by IntraMUSCular route once as needed for up to 1 dose. FLINTSTONES COMPLETE (IRON) chewable tablet Generic drug:  flintstones complete Take 1 Tab by mouth daily. trimethoprim-sulfamethoxazole 160-800 mg per tablet Commonly known as:  BACTRIM DS, SEPTRA DS Take 1 Tab by mouth two (2) times a day for 5 days. Prescriptions Sent to Pharmacy Refills  
 trimethoprim-sulfamethoxazole (BACTRIM DS, SEPTRA DS) 160-800 mg per tablet 0 Sig: Take 1 Tab by mouth two (2) times a day for 5 days. Class: Normal  
 Pharmacy: [facility] [address], [address] [place] [address] [address] Ph #: [phone] Route: Oral  
  
We Performed the Following AMB POC URINALYSIS DIP STICK MANUAL W/O MICRO [95100 CPT(R)] CULTURE, URINE [id number] CPT(R)] Follow-up Instructions Return if symptoms worsen or fail to improve. Patient Instructions Finish antibiotics Await culture result Drink lots of fluids Do not hold urine Follow-up if worse or not better next 1-2 days Introducing [facility]! Dari Pruitt introduces [employer] patient portal. Now you can access parts of your medical record, email your doctor's office, and request medication refills online. 1. In your internet browser, go to [email or web address] 2. Click on the First Time User? Click Here link in the Sign In box. You will see the New Member Sign Up page. 3. Enter your [employer] Access Code exactly as it appears below. You will not need to use this code after youve completed the sign-up process. If you do not sign up before the expiration date, you must request a new code. · [employer] Access Code: [email or web address] Expires: 9/25/2018  3:21 PM 
 
4. Enter the last four digits of your Social Security Number (xxxx) and Date of Birth (mm/dd/yyyy) as indicated and click Submit. You will be taken to the next sign-up page. 5. Create a [employer] ID. This will be your [employer] login ID and cannot be changed, so think of one that is secure and easy to remember. 6. Create a [employer] password. You can change your password at any time. 7. Enter your Password Reset Question and Answer. This can be used at a later time if you forget your password. 8. Enter your e-mail address. You will receive e-mail notification when new information is available in [address] 9. Click Sign Up. You can now view and download portions of your medical record. 10. Click the Download Summary menu link to download a portable copy of your medical information. If you have questions, please visit the Frequently Asked Questions section of the [employer] website. Remember, [employer] is NOT to be used for urgent needs. For medical emergencies, dial 911. Now available from your iPhone and Android! Please provide this summary of care documentation to your next provider. Your primary care clinician is listed as Makeda Garay.  If you have any questions after today's visit, please call [phone].

## 2018-06-27 NOTE — PROGRESS NOTES
Hayley Mccormack is a 75 y.o. female who presents to the office today with the following:  Chief Complaint   Patient presents with    Urinary Pain     has pain when she urinates       Allergies   Allergen Reactions    Venom-Honey Bee Swelling and Unknown (comments)    Codeine Other (comments)     Extreme fatigue, very sleepy       Current Outpatient Prescriptions   Medication Sig    trimethoprim-sulfamethoxazole (BACTRIM DS, SEPTRA DS) 160-800 mg per tablet Take 1 Tab by mouth two (2) times a day for 5 days.  citalopram (CELEXA) 20 mg tablet Take 1 Tab by mouth daily.  flintstones complete (FLINTSTONES COMPLETE, IRON,) chewable tablet Take 1 Tab by mouth daily.  calcium-cholecalciferol, d3, (CALCIUM 600 + D) 600-125 mg-unit tab Take  by mouth two (2) times a day.  EPINEPHrine (EPIPEN 2-MIN) 0.3 mg/0.3 mL (1:1,000) injection 0.3 mL by IntraMUSCular route once as needed for up to 1 dose. No current facility-administered medications for this visit.         Past Medical History:   Diagnosis Date    GERD (gastroesophageal reflux disease)     Hiatal hernia     resolved    Hyperlipidemia     resolved    Hypothyroidism     resolved       Past Surgical History:   Procedure Laterality Date    ENDOSCOPY, COLON, DIAGNOSTIC      HX APPENDECTOMY      HX CATARACT REMOVAL  01/2017    right eye    HX COLONOSCOPY  2398,1513    q5y    HX ORTHOPAEDIC Right 2012    Rotator cuff    HX OTHER SURGICAL  1/16    stress echo    HX OTHER SURGICAL  07/08/2016    gastric sleeve    HX PO AND BSO         History   Smoking Status    Former Smoker    Quit date: 3/1/1993   Smokeless Tobacco    Never Used       Family History   Problem Relation Age of Onset    Cancer Mother      colon    Cancer Father      lungs and into brain    Hypertension Sister     Kidney Disease Sister     Heart Disease Brother     Cancer Son          History of Present Illness:  Patient here for evaluation urinary symptoms    Patient with 1 day history urinary urgency frequency and some dysuria. No fever no chills no back pain no abdominal pain. Symptoms exactly like her previous UTIs. She has not had one now in over a year. She previously responded well to a 5 day course of Bactrim      Review of Systems:    Review of systems negative except as noted above      Physical Exam:  Visit Vitals    /56 (BP 1 Location: Left arm, BP Patient Position: Sitting)    Pulse (!) 56    Temp 96.9 °F (36.1 °C) (Oral)    Resp 16    Ht 5' 5\" (1.651 m)    Wt 166 lb 3.2 oz (75.4 kg)    SpO2 98%    BMI 27.66 kg/m2     Vitals:    06/27/18 1458   BP: 107/56   BP 1 Location: Left arm   BP Patient Position: Sitting   Pulse: (!) 56   Resp: 16   Temp: 96.9 °F (36.1 °C)   TempSrc: Oral   SpO2: 98%   Weight: 166 lb 3.2 oz (75.4 kg)   Height: 5' 5\" (1.651 m)       Patient was in no acute distress. Vital signs as above. Afebrile  Chest was clear no wheezes rhonchi or rales  Sinus bradycardia which is her baseline no S3 no S4 no murmur  Abdomen soft nontender. No suprapubic tenderness  No CVA tenderness  Urinalysis positive for leukocytes and blood  Assessment/Plan:  1. Urinary pain    - AMB POC URINALYSIS DIP STICK MANUAL W/O MICRO    2. Acute cystitis without hematuria  Symptoms and exam consistent with UTI. Will treat patient with Bactrim. Methods to prevent recurrent UTIs discussed with patient. She will follow-up if her symptoms persist  - trimethoprim-sulfamethoxazole (BACTRIM DS, SEPTRA DS) 160-800 mg per tablet; Take 1 Tab by mouth two (2) times a day for 5 days. Dispense: 10 Tab; Refill: 0  - CULTURE, URINE    Patient Instructions   Finish antibiotics  Await culture result  Drink lots of fluids  Do not hold urine  Follow-up if worse or not better next 1-2 days            Continue current therapy plan except for indicated above.  Verbal and written instructions (see AVS) provided.  Patient expresses understanding of diagnosis and treatment plan.    Follow-up Disposition:  Return if symptoms worsen or fail to improve. Hanna Patches.  Juan Rivera MD

## 2018-06-27 NOTE — PATIENT INSTRUCTIONS
Finish antibiotics  Await culture result  Drink lots of fluids  Do not hold urine  Follow-up if worse or not better next 1-2 days

## 2018-06-29 LAB — BACTERIA UR CULT: ABNORMAL

## 2018-07-26 ENCOUNTER — OFFICE VISIT (OUTPATIENT)
Dept: FAMILY MEDICINE CLINIC | Age: 68
End: 2018-07-26

## 2018-07-26 VITALS
HEIGHT: 65 IN | OXYGEN SATURATION: 97 % | SYSTOLIC BLOOD PRESSURE: 95 MMHG | BODY MASS INDEX: 27.52 KG/M2 | DIASTOLIC BLOOD PRESSURE: 60 MMHG | HEART RATE: 58 BPM | WEIGHT: 165.2 LBS | RESPIRATION RATE: 14 BRPM | TEMPERATURE: 99.2 F

## 2018-07-26 DIAGNOSIS — Z12.39 BREAST CANCER SCREENING: ICD-10-CM

## 2018-07-26 DIAGNOSIS — Z00.00 MEDICARE ANNUAL WELLNESS VISIT, SUBSEQUENT: Primary | ICD-10-CM

## 2018-07-26 DIAGNOSIS — E66.3 OVERWEIGHT (BMI 25.0-29.9): ICD-10-CM

## 2018-07-26 DIAGNOSIS — F41.9 ANXIETY: ICD-10-CM

## 2018-07-26 RX ORDER — CITALOPRAM 20 MG/1
20 TABLET, FILM COATED ORAL DAILY
Qty: 90 TAB | Refills: 1 | Status: SHIPPED | OUTPATIENT
Start: 2018-07-26 | End: 2019-06-10 | Stop reason: SDUPTHER

## 2018-07-26 NOTE — PROGRESS NOTES
Chief Complaint   Patient presents with   [facility] Wellness Visit     [address] Tavo Garcia 56 is a 75 y.o. female and presents for annual Medicare Wellness Visit. Problem List: Reviewed with patient and discussed risk factors. Patient Active Problem List   Diagnosis Code    Hyperlipidemia E78.5    Hypothyroidism E03.9    Hiatal hernia K44.9    GERD (gastroesophageal reflux disease) K21.9    Advance directive discussed with patient Z71.89       Current medical providers:  Patient Care Team:  Jaskaran Melendez MD as PCP - General (Family Practice)    PSH: Reviewed with patient  Past Surgical History:   Procedure Laterality Date    ENDOSCOPY, COLON, DIAGNOSTIC      HX APPENDECTOMY      HX CATARACT REMOVAL  01/2017    right eye    HX COLONOSCOPY  9783,0253    q5y    HX ORTHOPAEDIC Right 2012    Rotator cuff    HX OTHER SURGICAL  1/16    stress echo    HX OTHER SURGICAL  07/08/2016    gastric sleeve    HX PO AND BSO          SH: Reviewed with patient  Social History   Substance Use Topics    Smoking status: Former Smoker     Packs/day: 1.00     Years: 20.00     Quit date: 3/1/1993    Smokeless tobacco: Never Used    Alcohol use 0.0 oz/week     0 Standard drinks or equivalent per week      Comment: maybe once a month       FH: Reviewed with patient  Family History   Problem Relation Age of Onset    Cancer Mother      colon    Cancer Father      lungs and into brain    Hypertension Sister     Kidney Disease Sister     Heart Disease Brother     Cancer Son        Medications/Allergies: Reviewed with patient  Current Outpatient Prescriptions on File Prior to Visit   Medication Sig Dispense Refill    citalopram (CELEXA) 20 mg tablet Take 1 Tab by mouth daily. 90 Tab 1    flintstones complete (FLINTSTONES COMPLETE, IRON,) chewable tablet Take 1 Tab by mouth daily.  calcium-cholecalciferol, d3, (CALCIUM 600 + D) 600-125 mg-unit tab Take  by mouth two (2) times a day.       EPINEPHrine (EPIPEN 2-MIN) 0.3 mg/0.3 mL (1:1,000) injection 0.3 mL by IntraMUSCular route once as needed for up to 1 dose. 1 Each prn    trimethoprim-sulfamethoxazole (BACTRIM DS, SEPTRA DS) 160-800 mg per tablet Take 1 Tab by mouth two (2) times a day for 5 days. 10 Tab 0     No current facility-administered medications on file prior to visit. Allergies   Allergen Reactions    Venom-Honey Bee Swelling and Unknown (comments)    Codeine Other (comments)     Extreme fatigue, very sleepy       Objective:  Visit Vitals    BP 95/60 (BP 1 Location: Left arm, BP Patient Position: Sitting)    Pulse (!) 58    Temp 99.2 °F (37.3 °C) (Oral)    Resp 14    Ht 5' 5\" (1.651 m)    Wt 165 lb 3.2 oz (74.9 kg)    SpO2 97%    BMI 27.49 kg/m2    Body mass index is 27.49 kg/(m^2). Assessment of cognitive impairment: Alert and oriented x 3    Depression Screen:   PHQ over the last two weeks 7/26/2018   Little interest or pleasure in doing things Not at all   Feeling down, depressed, irritable, or hopeless Not at all   Total Score PHQ 2 0       Fall Risk Assessment:    Fall Risk Assessment, last 12 mths 7/26/2018   Able to walk? Yes   Fall in past 12 months? No       Functional Ability:   Does the patient exhibit a steady gait? yes   How long did it take the patient to get up and walk from a sitting position? 3sec   Is the patient self reliant?  (ie can do own laundry, meals, household chores)  yes     Does the patient handle his/her own medications? yes     Does the patient handle his/her own money? yes     Is the patients home safe (ie good lighting, handrails on stairs and bath, etc.)? yes     Did you notice or did patient express any hearing difficulties? no     Did you notice or did patient express any vision difficulties?   no     Were distance and reading eye charts used?   no       Advance Care Planning:   Patient was offered the opportunity to discuss advance care planning:  yes     Does patient have an Advance Directive:  no   If no, did you provide information on Caring Connections? yes       Plan:      No orders of the defined types were placed in this encounter. Health Maintenance   Topic Date Due    BREAST CANCER SCRN MAMMOGRAM  03/07/2018    MEDICARE YEARLY EXAM  06/27/2018    Influenza Age 9 to Adult  08/01/2018    GLAUCOMA SCREENING Q2Y  02/22/2019    COLONOSCOPY  11/10/2019    DTaP/Tdap/Td series (2 - Td) 06/26/2027    Hepatitis C Screening  Completed    Bone Densitometry (Dexa) Screening  Completed    ZOSTER VACCINE AGE 60>  Addressed    Pneumococcal 65+ Low/Medium Risk  Completed       *Patient verbalized understanding and agreement with the plan. A copy of the After Visit Summary with personalized health plan was given to the patient today. 1. Have you been to the ER, urgent care clinic since your last visit? Hospitalized since your last visit? No    2. Have you seen or consulted any other health care providers outside of the [address] [place] [address] since your last visit? Include any pap smears or colon screening.  No    Kellen Gilbert LPN

## 2018-07-26 NOTE — PROGRESS NOTES
HISTORY OF PRESENT ILLNESS  Salma iSlva is a 75 y.o. female. Chief Complaint   Patient presents with   [facility] Annual Wellness Visit            HPI  HM reviewed  Functioning well    Anxiety  Much better on Celexa  No SE  Doing better    Overweight  Had sleeve   Weight creeping  No formal exercises but active    Review of Systems   Constitutional: Negative for weight loss. HENT: Negative for congestion and hearing loss. Eyes: Negative for blurred vision. Respiratory: Negative for cough. Cardiovascular: Negative for chest pain. Gastrointestinal: Negative for abdominal pain, blood in stool and melena. Genitourinary: Negative for frequency and hematuria. Musculoskeletal: Negative for falls, joint pain and myalgias. Skin: Negative. Neurological: Negative for dizziness and headaches. Endo/Heme/Allergies: Negative for polydipsia. Psychiatric/Behavioral: The patient is not nervous/anxious. Past Medical History:   Diagnosis Date    Anxiety     Depression     GERD (gastroesophageal reflux disease)     Hiatal hernia     resolved    Hyperlipidemia     resolved    Hypothyroidism     resolved       Past Surgical History:   Procedure Laterality Date    ENDOSCOPY, COLON, DIAGNOSTIC      HX APPENDECTOMY      HX CATARACT REMOVAL  01/2017    right eye    HX COLONOSCOPY  4349,2805    q5y    HX ORTHOPAEDIC Right 2012    Rotator cuff    HX OTHER SURGICAL  1/16    stress echo    HX OTHER SURGICAL  07/08/2016    gastric sleeve    HX PO AND BSO         Current Outpatient Prescriptions   Medication Sig Dispense Refill    citalopram (CELEXA) 20 mg tablet Take 1 Tab by mouth daily. 90 Tab 1    flintstones complete (FLINTSTONES COMPLETE, IRON,) chewable tablet Take 1 Tab by mouth daily.  calcium-cholecalciferol, d3, (CALCIUM 600 + D) 600-125 mg-unit tab Take  by mouth two (2) times a day.       EPINEPHrine (EPIPEN 2-MIN) 0.3 mg/0.3 mL (1:1,000) injection 0.3 mL by IntraMUSCular route once as needed for up to 1 dose. 1 Each prn    trimethoprim-sulfamethoxazole (BACTRIM DS, SEPTRA DS) 160-800 mg per tablet Take 1 Tab by mouth two (2) times a day for 5 days. 10 Tab 0       Allergies   Allergen Reactions    Venom-Honey Bee Swelling and Unknown (comments)    Codeine Other (comments)     Extreme fatigue, very sleepy       Visit Vitals    BP 95/60 (BP 1 Location: Left arm, BP Patient Position: Sitting)    Pulse (!) 58    Temp 99.2 °F (37.3 °C) (Oral)    Resp 14    Ht 5' 5\" (1.651 m)    Wt 165 lb 3.2 oz (74.9 kg)    SpO2 97%    BMI 27.49 kg/m2     Physical Exam   Constitutional: She is oriented to person, place, and time. She appears well-developed and well-nourished. No distress. HENT:   Head: Normocephalic and atraumatic. Right Ear: External ear normal.   Left Ear: External ear normal.   Mouth/Throat: Oropharynx is clear and moist. No oropharyngeal exudate. Eyes: Conjunctivae and EOM are normal.   Cardiovascular: Normal rate, regular rhythm, normal heart sounds and intact distal pulses. Pulmonary/Chest: Effort normal and breath sounds normal.   Abdominal: Soft. Bowel sounds are normal.   Musculoskeletal: She exhibits no edema. Lymphadenopathy:     She has no cervical adenopathy. Neurological: She is alert and oriented to person, place, and time. Skin: Skin is warm and dry. Psychiatric: She has a normal mood and affect. Nursing note and vitals reviewed. ASSESSMENT and PLAN    ICD-10-CM ICD-9-CM    1. Medicare annual wellness visit, subsequent Z00.00 V70.0    2. Breast cancer screening Z12.31 V76.10 [facility] 3D ABRAM W MAMMO BI SCREENING INCL CAD   3. Anxiety F41.9 300.00 citalopram (CELEXA) 20 mg tablet   4. Overweight (BMI 25.0-29. 9) E66.3 278.02    Diet, exercise and weight loss discussed.

## 2018-07-26 NOTE — PATIENT INSTRUCTIONS
Schedule of Personalized Health Plan  (Provide Copy to Patient)  The best way to stay healthy is to live a healthy lifestyle. A healthy lifestyle includes regular exercise, eating a well-balanced diet, keeping a healthy weight and not smoking. Regular physical exams and screening tests are another important way to take care of yourself. Preventive exams provided by health care providers can find health problems early when treatment works best and can keep you from getting certain diseases or illnesses. Preventive services include exams, lab tests, screenings, shots, monitoring and information to help you take care of your own health. All people over 65 should have a pneumonia shot. Pneumonia shots are usually only needed once in a lifetime unless your doctor decides differently. All people over 65 should have a yearly flu shot. People over 65 are at medium to high risk for Hepatitis B. Three shots are needed for complete protection. In addition to your physical exam, some screening tests are recommended:    Bone mass measurement (dexa scan) is recommended every two years  Diabetes Mellitus screening is recommended every year. Glaucoma is an eye disease caused by high pressure in the eye. An eye exam is recommended every year. Cardiovascular screening tests that check your cholesterol and other blood fat (lipid) levels are recommended every five years. Colorectal Cancer screening tests help to find pre-cancerous polyps (growths in the colon) so they can be removed before they turn into cancer. Tests ordered for screening depend on your personal and family history risk factors.     Screening for Breast Cancer is recommended yearly with a mammogram.    Screening for Cervical Cancer is recommended every two years (annually for certain risk factors, such as previous history of STD or abnormal PAP in past 7 years), with a Pelvic Exam with PAP    Here is a list of your current Health Maintenance items with a due date:  Health Maintenance   Topic Date Due    BREAST CANCER SCRN MAMMOGRAM  03/07/2018    MEDICARE YEARLY EXAM  06/27/2018    Influenza Age 9 to Adult  08/01/2018    GLAUCOMA SCREENING Q2Y  02/22/2019    COLONOSCOPY  11/10/2019    DTaP/Tdap/Td series (2 - Td) 06/26/2027    Hepatitis C Screening  Completed    Bone Densitometry (Dexa) Screening  Completed    ZOSTER VACCINE AGE 60>  Addressed    Pneumococcal 65+ Low/Medium Risk  Completed

## 2018-07-26 NOTE — ACP (ADVANCE CARE PLANNING)
Advance Care Planning (ACP) Provider Conversation Snapshot    Date of ACP Conversation: 07/26/18  Persons included in Conversation:  patient  Length of ACP Conversation in minutes:  <16 minutes (Non-Billable)    HO given and pt will review and bring back a copy

## 2018-08-20 ENCOUNTER — OFFICE VISIT (OUTPATIENT)
Dept: FAMILY MEDICINE CLINIC | Age: 68
End: 2018-08-20

## 2018-08-20 VITALS
SYSTOLIC BLOOD PRESSURE: 117 MMHG | OXYGEN SATURATION: 98 % | HEIGHT: 65 IN | RESPIRATION RATE: 20 BRPM | WEIGHT: 165 LBS | BODY MASS INDEX: 27.49 KG/M2 | TEMPERATURE: 97.8 F | DIASTOLIC BLOOD PRESSURE: 67 MMHG | HEART RATE: 68 BPM

## 2018-08-20 DIAGNOSIS — N30.01 ACUTE CYSTITIS WITH HEMATURIA: Primary | ICD-10-CM

## 2018-08-20 LAB
BILIRUB UR QL STRIP: NEGATIVE
GLUCOSE UR-MCNC: NEGATIVE MG/DL
KETONES P FAST UR STRIP-MCNC: NEGATIVE MG/DL
PH UR STRIP: 5.5 [PH] (ref 4.6–8)
PROT UR QL STRIP: NEGATIVE
SP GR UR STRIP: 1.02 (ref 1–1.03)
UA UROBILINOGEN AMB POC: NORMAL (ref 0.2–1)
URINALYSIS CLARITY POC: CLEAR
URINALYSIS COLOR POC: YELLOW
URINE BLOOD POC: NORMAL
URINE LEUKOCYTES POC: NORMAL
URINE NITRITES POC: NEGATIVE

## 2018-08-20 RX ORDER — CEPHALEXIN 500 MG/1
500 CAPSULE ORAL 2 TIMES DAILY
Qty: 14 CAP | Refills: 0 | Status: SHIPPED | OUTPATIENT
Start: 2018-08-20 | End: 2018-08-27

## 2018-08-20 NOTE — PROGRESS NOTES
Nicki Joseph is a 75 y.o. female who presents to the office today with the following:  Chief Complaint   Patient presents with    Urinary Frequency       HPI  Urgency and frequency since Sat morning. Pain \"like pulling\" up into lower abd when urinates, no burning. Noticed no blood, fever/chills, or back pain. Also no n/v/d. Tried some AZO. Helped get through weekend. Drinking a lot of water. H/o UTIs. Tends to get in summer when sweats a lot. Rides horses. If does not change out of wet clothes soon enough gets UTI. Did do this recently. Otherwise feeling well with no other complaints or acute concerns. Review of Systems   Constitutional: Negative for chills, fever and malaise/fatigue. Gastrointestinal: Negative for diarrhea, nausea and vomiting. Genitourinary: Positive for dysuria, frequency and urgency. Negative for flank pain and hematuria. Musculoskeletal: Negative for myalgias. Skin: Negative for rash. See HPI. Past Medical History:   Diagnosis Date    Anxiety     Depression     GERD (gastroesophageal reflux disease)     Hiatal hernia     resolved    Hyperlipidemia     resolved    Hypothyroidism     resolved       Past Surgical History:   Procedure Laterality Date    ENDOSCOPY, COLON, DIAGNOSTIC      HX APPENDECTOMY      HX CATARACT REMOVAL  01/2017    right eye    HX COLONOSCOPY  0906,1962    q5y    HX ORTHOPAEDIC Right 2012    Rotator cuff    HX OTHER SURGICAL  1/16    stress echo    HX OTHER SURGICAL  07/08/2016    gastric sleeve    HX PO AND BSO         Allergies   Allergen Reactions    Venom-Honey Bee Swelling and Unknown (comments)    Codeine Other (comments)     Extreme fatigue, very sleepy       Current Outpatient Prescriptions   Medication Sig    cephALEXin (KEFLEX) 500 mg capsule Take 1 Cap by mouth two (2) times a day for 7 days.  citalopram (CELEXA) 20 mg tablet Take 1 Tab by mouth daily.     flintstones complete (FLINTSTONES COMPLETE, IRON,) chewable tablet Take 1 Tab by mouth daily.  calcium-cholecalciferol, d3, (CALCIUM 600 + D) 600-125 mg-unit tab Take  by mouth two (2) times a day.  EPINEPHrine (EPIPEN 2-MIN) 0.3 mg/0.3 mL (1:1,000) injection 0.3 mL by IntraMUSCular route once as needed for up to 1 dose.  trimethoprim-sulfamethoxazole (BACTRIM DS, SEPTRA DS) 160-800 mg per tablet Take 1 Tab by mouth two (2) times a day for 5 days. No current facility-administered medications for this visit. Social History     Social History    Marital status:      Spouse name: N/A    Number of children: N/A    Years of education: N/A     Social History Main Topics    Smoking status: Former Smoker     Packs/day: 1.00     Years: 20.00     Quit date: 3/1/1993    Smokeless tobacco: Never Used    Alcohol use 0.0 oz/week     0 Standard drinks or equivalent per week      Comment: maybe once a month    Drug use: No    Sexual activity: Yes     Partners: Male     Other Topics Concern    None     Social History Narrative       Family History   Problem Relation Age of Onset    Cancer Mother      colon    Cancer Father      lungs and into brain    Hypertension Sister     Kidney Disease Sister     Heart Disease Brother     Cancer Son          Physical Exam:  Visit Vitals    /67 (BP 1 Location: Left arm, BP Patient Position: At rest)    Pulse 68    Temp 97.8 °F (36.6 °C) (Oral)    Resp 20    Ht 5' 5\" (1.651 m)    Wt 165 lb (74.8 kg)    SpO2 98%    BMI 27.46 kg/m2     Physical Exam   Constitutional: She is oriented to person, place, and time and well-developed, well-nourished, and in no distress. HENT:   Head: Normocephalic and atraumatic. Eyes: Conjunctivae are normal.   Cardiovascular: Normal rate and regular rhythm. Pulmonary/Chest: Effort normal and breath sounds normal.   Abdominal: Soft. Normal appearance. She exhibits no distension and no mass. There is no hepatosplenomegaly.  There is tenderness (mild suprapubic discomfort with deep palpation). There is no rebound, no guarding and no CVA tenderness. Neurological: She is alert and oriented to person, place, and time. Gait normal.   Skin: Skin is warm and dry. Psychiatric: Mood and affect normal.   Nursing note and vitals reviewed. Assessment/Plan:    ICD-10-CM ICD-9-CM    1. Acute cystitis with hematuria N30.01 595.0 AMB POC URINALYSIS DIP STICK MANUAL W/O MICRO      CULTURE, URINE      cephALEXin (KEFLEX) 500 mg capsule     Results for orders placed or performed in visit on 08/20/18   AMB POC URINALYSIS DIP STICK MANUAL W/O MICRO   Result Value Ref Range    Color (UA POC) Yellow     Clarity (UA POC) Clear     Glucose (UA POC) Negative Negative    Bilirubin (UA POC) Negative Negative    Ketones (UA POC) Negative Negative    Specific gravity (UA POC) 1.020 1.001 - 1.035    Blood (UA POC) 1+ Negative    pH (UA POC) 5.5 4.6 - 8.0    Protein (UA POC) Negative Negative    Urobilinogen (UA POC) 0.2 mg/dL 0.2 - 1    Nitrites (UA POC) Negative Negative    Leukocyte esterase (UA POC) 1+ Negative         Culture pending. Will call with results/further instruction if needed. Instructed to push fluids (ie water/cranberry juice). Avoid caffeine and carbonated bvgs. Counseled on otc medications for sxs relief. Educational handout provided with home care instructions. Instructed to RTO/seek medical attn if sxs persist/worsen. Rec repeat urine specimen after tx. Follow-up Disposition:  Return if symptoms worsen or fail to improve.     Nestor Fabian PA-C

## 2018-08-20 NOTE — PATIENT INSTRUCTIONS
Urinary Tract Infection in Women: Care Instructions  Your Care Instructions    A urinary tract infection, or UTI, is a general term for an infection anywhere between the kidneys and the urethra (where urine comes out). Most UTIs are bladder infections. They often cause pain or burning when you urinate. UTIs are caused by bacteria and can be cured with antibiotics. Be sure to complete your treatment so that the infection goes away. Follow-up care is a key part of your treatment and safety. Be sure to make and go to all appointments, and call your doctor if you are having problems. It's also a good idea to know your test results and keep a list of the medicines you take. How can you care for yourself at home? · Take your antibiotics as directed. Do not stop taking them just because you feel better. You need to take the full course of antibiotics. · Drink extra water and other fluids for the next day or two. This may help wash out the bacteria that are causing the infection. (If you have kidney, heart, or liver disease and have to limit fluids, talk with your doctor before you increase your fluid intake.)  · Avoid drinks that are carbonated or have caffeine. They can irritate the bladder. · Urinate often. Try to empty your bladder each time. · To relieve pain, take a hot bath or lay a heating pad set on low over your lower belly or genital area. Never go to sleep with a heating pad in place. To prevent UTIs  · Drink plenty of water each day. This helps you urinate often, which clears bacteria from your system. (If you have kidney, heart, or liver disease and have to limit fluids, talk with your doctor before you increase your fluid intake.)  · Urinate when you need to. · Urinate right after you have sex. · Change sanitary pads often. · Avoid douches, bubble baths, feminine hygiene sprays, and other feminine hygiene products that have deodorants.   · After going to the bathroom, wipe from front to back.  When should you call for help? Call your doctor now or seek immediate medical care if:    · Symptoms such as fever, chills, nausea, or vomiting get worse or appear for the first time.     · You have new pain in your back just below your rib cage. This is called flank pain.     · There is new blood or pus in your urine.     · You have any problems with your antibiotic medicine.    Watch closely for changes in your health, and be sure to contact your doctor if:    · You are not getting better after taking an antibiotic for 2 days.     · Your symptoms go away but then come back. Where can you learn more? Go to http://darrion-gerson.info/. Enter H909 in the search box to learn more about \"Urinary Tract Infection in Women: Care Instructions. \"  Current as of: May 12, 2017  Content Version: 11.7  © 1473-0305 [employer]. Care instructions adapted under license by [employer] (which disclaims liability or warranty for this information). If you have questions about a medical condition or this instruction, always ask your healthcare professional. [address] any warranty or liability for your use of this information.

## 2018-08-20 NOTE — MR AVS SNAPSHOT
[facility] 6 
[phone] Patient: Brynn Jha MRN: [id number] :1950 Visit Information Date & Time Provider Department Dept. Phone Encounter #  
 2018  2:00 PM Mar Styles PA-C [address] [id number] Upcoming Health Maintenance Date Due  
 BREAST CANCER SCRN MAMMOGRAM 3/7/2018 Influenza Age 5 to Adult 2018 GLAUCOMA SCREENING Q2Y 2019 MEDICARE YEARLY EXAM 2019 COLONOSCOPY 11/10/2019 DTaP/Tdap/Td series (2 - Td) 2027 Allergies as of 2018  Review Complete On: 2018 By: Merline Smith LPN Severity Noted Reaction Type Reactions Venom-honey Bee High 2013   Not Verified Swelling, Unknown (comments) Codeine  2016    Other (comments) Extreme fatigue, very sleepy Current Immunizations  Reviewed on 2016 Name Date Influenza High Dose Vaccine PF 2016 Influenza Vaccine 10/14/2014, 2014 Pneumococcal Conjugate (PCV-13) 6/3/2016 Pneumococcal Polysaccharide (PPSV-23) 2015 Tetanus Toxoid, Adsorbed 2015 Not reviewed this visit You Were Diagnosed With   
  
 Codes Comments Acute cystitis with hematuria    -  Primary ICD-10-CM: N30.01 
ICD-9-CM: 595.0 Vitals BP Pulse Temp Resp Height(growth percentile) Weight(growth percentile)  
 117/67 (BP 1 Location: Left arm, BP Patient Position: At rest) 68 97.8 °F (36.6 °C) (Oral) 20 5' 5\" (1.651 m) 165 lb (74.8 kg) SpO2 BMI OB Status Smoking Status 98% 27.46 kg/m2 Hysterectomy Former Smoker BMI and BSA Data Body Mass Index Body Surface Area  
 27.46 kg/m 2 1.85 m 2 Preferred Pharmacy Pharmacy Name Phone [facility] [address], [address] Your Updated Medication List  
  
   
 This list is accurate as of 8/20/18  2:44 PM.  Always use your most recent med list.  
  
  
  
  
 CALCIUM 600 + D 600-125 mg-unit Tab Generic drug:  calcium-cholecalciferol (d3) Take  by mouth two (2) times a day. cephALEXin 500 mg capsule Commonly known as:  Singh Diane Take 1 Cap by mouth two (2) times a day for 7 days. citalopram 20 mg tablet Commonly known as:  Viona Creamer Take 1 Tab by mouth daily. EPINEPHrine 0.3 mg/0.3 mL injection Commonly known as:  EPIPEN 2-MIN  
0.3 mL by IntraMUSCular route once as needed for up to 1 dose. FLINTSTONES COMPLETE (IRON) chewable tablet Generic drug:  flintstones complete Take 1 Tab by mouth daily. trimethoprim-sulfamethoxazole 160-800 mg per tablet Commonly known as:  BACTRIM DS, SEPTRA DS Take 1 Tab by mouth two (2) times a day for 5 days. Prescriptions Sent to Pharmacy Refills  
 cephALEXin (KEFLEX) 500 mg capsule 0 Sig: Take 1 Cap by mouth two (2) times a day for 7 days. Class: Normal  
 Pharmacy: [facility] [address]  Ph #: [phone] Route: Oral  
  
We Performed the Following AMB POC URINALYSIS DIP STICK MANUAL W/O MICRO [25154 CPT(R)] CULTURE, URINE [id number] CPT(R)] Introducing [facility]! Jaclyn Morales introduces [employer] patient portal. Now you can access parts of your medical record, email your doctor's office, and request medication refills online. 1. In your internet browser, go to [email or web address] 2. Click on the First Time User? Click Here link in the Sign In box. You will see the New Member Sign Up page. 3. Enter your [employer] Access Code exactly as it appears below. You will not need to use this code after youve completed the sign-up process. If you do not sign up before the expiration date, you must request a new code. · [employer] Access Code: [email or web address] Expires: 9/25/2018  3:21 PM 
 
 4. Enter the last four digits of your Social Security Number (xxxx) and Date of Birth (mm/dd/yyyy) as indicated and click Submit. You will be taken to the next sign-up page. 5. Create a [email or web address] ID. This will be your [email or web address] login ID and cannot be changed, so think of one that is secure and easy to remember. 6. Create a [email or web address] password. You can change your password at any time. 7. Enter your Password Reset Question and Answer. This can be used at a later time if you forget your password. 8. Enter your e-mail address. You will receive e-mail notification when new information is available in [address] 9. Click Sign Up. You can now view and download portions of your medical record. 10. Click the Download Summary menu link to download a portable copy of your medical information. If you have questions, please visit the Frequently Asked Questions section of the [email or web address] website. Remember, [email or web address] is NOT to be used for urgent needs. For medical emergencies, dial 911. Now available from your iPhone and Android! Please provide this summary of care documentation to your next provider. Your primary care clinician is listed as Makeda Garay. If you have any questions after today's visit, please call [phone].

## 2018-08-23 LAB — BACTERIA UR CULT: ABNORMAL

## 2018-08-23 NOTE — PROGRESS NOTES
Notify pt urine culture pos for e.coli but abx she was prescribed should help.  rec leave repeat urine specimen once finishes tx in ~2-3 wks.

## 2019-08-08 LAB — MAMMOGRAPHY, EXTERNAL: NORMAL

## 2020-02-28 PROBLEM — H25.12 AGE-RELATED NUCLEAR CATARACT, LEFT EYE: Chronic | Status: ACTIVE | Noted: 2020-02-28

## 2020-03-04 PROBLEM — H25.12 AGE-RELATED NUCLEAR CATARACT, LEFT EYE: Chronic | Status: RESOLVED | Noted: 2020-02-28 | Resolved: 2020-03-04

## 2021-10-18 ENCOUNTER — OFFICE VISIT (OUTPATIENT)
Dept: FAMILY MEDICINE CLINIC | Age: 71
End: 2021-10-18
Payer: MEDICARE

## 2021-10-18 VITALS
TEMPERATURE: 97.3 F | BODY MASS INDEX: 32.43 KG/M2 | HEART RATE: 53 BPM | OXYGEN SATURATION: 96 % | HEIGHT: 63 IN | WEIGHT: 183 LBS | RESPIRATION RATE: 14 BRPM | SYSTOLIC BLOOD PRESSURE: 120 MMHG | DIASTOLIC BLOOD PRESSURE: 77 MMHG

## 2021-10-18 DIAGNOSIS — Z12.31 ENCOUNTER FOR SCREENING MAMMOGRAM FOR MALIGNANT NEOPLASM OF BREAST: ICD-10-CM

## 2021-10-18 DIAGNOSIS — F32.A DEPRESSION, UNSPECIFIED DEPRESSION TYPE: Primary | ICD-10-CM

## 2021-10-18 DIAGNOSIS — Z12.11 COLON CANCER SCREENING: ICD-10-CM

## 2021-10-18 DIAGNOSIS — Z13.6 SCREENING FOR CARDIOVASCULAR CONDITION: ICD-10-CM

## 2021-10-18 DIAGNOSIS — Z00.00 MEDICARE ANNUAL WELLNESS VISIT, SUBSEQUENT: ICD-10-CM

## 2021-10-18 LAB
ALBUMIN SERPL-MCNC: 3.8 G/DL (ref 3.5–5)
ALBUMIN/GLOB SERPL: 1.2 {RATIO} (ref 1.1–2.2)
ALP SERPL-CCNC: 103 U/L (ref 45–117)
ALT SERPL-CCNC: 20 U/L (ref 12–78)
ANION GAP SERPL CALC-SCNC: 5 MMOL/L (ref 5–15)
AST SERPL-CCNC: 18 U/L (ref 15–37)
BASOPHILS # BLD: 0.1 K/UL (ref 0–0.1)
BASOPHILS NFR BLD: 1 % (ref 0–1)
BILIRUB SERPL-MCNC: 0.4 MG/DL (ref 0.2–1)
BUN SERPL-MCNC: 19 MG/DL (ref 6–20)
BUN/CREAT SERPL: 20 (ref 12–20)
CALCIUM SERPL-MCNC: 9.2 MG/DL (ref 8.5–10.1)
CHLORIDE SERPL-SCNC: 102 MMOL/L (ref 97–108)
CHOLEST SERPL-MCNC: 273 MG/DL
CO2 SERPL-SCNC: 27 MMOL/L (ref 21–32)
CREAT SERPL-MCNC: 0.97 MG/DL (ref 0.55–1.02)
DIFFERENTIAL METHOD BLD: ABNORMAL
EOSINOPHIL # BLD: 0.5 K/UL (ref 0–0.4)
EOSINOPHIL NFR BLD: 7 % (ref 0–7)
ERYTHROCYTE [DISTWIDTH] IN BLOOD BY AUTOMATED COUNT: 13.2 % (ref 11.5–14.5)
GLOBULIN SER CALC-MCNC: 3.2 G/DL (ref 2–4)
GLUCOSE SERPL-MCNC: 92 MG/DL (ref 65–100)
HCT VFR BLD AUTO: 44.8 % (ref 35–47)
HDLC SERPL-MCNC: 68 MG/DL
HDLC SERPL: 4 {RATIO} (ref 0–5)
HGB BLD-MCNC: 13.9 G/DL (ref 11.5–16)
IMM GRANULOCYTES # BLD AUTO: 0 K/UL (ref 0–0.04)
IMM GRANULOCYTES NFR BLD AUTO: 0 % (ref 0–0.5)
LDLC SERPL CALC-MCNC: 174.6 MG/DL (ref 0–100)
LYMPHOCYTES # BLD: 2.4 K/UL (ref 0.8–3.5)
LYMPHOCYTES NFR BLD: 37 % (ref 12–49)
MCH RBC QN AUTO: 30.3 PG (ref 26–34)
MCHC RBC AUTO-ENTMCNC: 31 G/DL (ref 30–36.5)
MCV RBC AUTO: 97.8 FL (ref 80–99)
MONOCYTES # BLD: 0.5 K/UL (ref 0–1)
MONOCYTES NFR BLD: 8 % (ref 5–13)
NEUTS SEG # BLD: 3 K/UL (ref 1.8–8)
NEUTS SEG NFR BLD: 47 % (ref 32–75)
NRBC # BLD: 0 K/UL (ref 0–0.01)
NRBC BLD-RTO: 0 PER 100 WBC
PLATELET # BLD AUTO: 319 K/UL (ref 150–400)
PMV BLD AUTO: 11.1 FL (ref 8.9–12.9)
POTASSIUM SERPL-SCNC: 4.2 MMOL/L (ref 3.5–5.1)
PROT SERPL-MCNC: 7 G/DL (ref 6.4–8.2)
RBC # BLD AUTO: 4.58 M/UL (ref 3.8–5.2)
SODIUM SERPL-SCNC: 134 MMOL/L (ref 136–145)
TRIGL SERPL-MCNC: 152 MG/DL (ref ?–150)
VLDLC SERPL CALC-MCNC: 30.4 MG/DL
WBC # BLD AUTO: 6.4 K/UL (ref 3.6–11)

## 2021-10-18 PROCEDURE — G0439 PPPS, SUBSEQ VISIT: HCPCS | Performed by: FAMILY MEDICINE

## 2021-10-18 PROCEDURE — 99213 OFFICE O/P EST LOW 20 MIN: CPT | Performed by: FAMILY MEDICINE

## 2021-10-18 RX ORDER — CITALOPRAM 40 MG/1
40 TABLET, FILM COATED ORAL DAILY
Qty: 90 TABLET | Refills: 0 | Status: SHIPPED | OUTPATIENT
Start: 2021-10-18 | End: 2021-11-18 | Stop reason: SDUPTHER

## 2021-10-18 NOTE — PROGRESS NOTES
1. Have you been to the ER, urgent care clinic since your last visit? Hospitalized since your last visit? No    2. Have you seen or consulted any other health care providers outside of the [address] [place] [address] since your last visit? Include any pap smears or colon screening. No  This is the Subsequent Medicare Annual Wellness Exam, performed 12 months or more after the Initial AWV or the last Subsequent AWV    I have reviewed the patient's medical history in detail and updated the computerized patient record. Assessment/Plan   Education and counseling provided:  Are appropriate based on today's review and evaluation    1. Depression, unspecified depression type  -     CBC WITH AUTOMATED DIFF; Future  -     METABOLIC PANEL, COMPREHENSIVE; Future  -     citalopram (CELEXA) 40 mg tablet; Take 1 Tablet by mouth daily. , Normal, Disp-90 Tablet, R-0  2. Screening for cardiovascular condition  -     LIPID PANEL; Future  3. Colon cancer screening  -     COLOGUARD TEST (FECAL DNA COLORECTAL CANCER SCREENING)  4. Encounter for screening mammogram for malignant neoplasm of breast  -     [facility] 3D ABRAM W MAMMO BI SCREENING INCL CAD; Future  5.  Medicare annual wellness visit, subsequent       Depression Risk Factor Screening     3 most recent PHQ Screens 10/15/2020   PHQ Not Done -   Little interest or pleasure in doing things Several days   Feeling down, depressed, irritable, or hopeless Several days   Total Score PHQ 2 2   Trouble falling or staying asleep, or sleeping too much -   Feeling tired or having little energy -   Poor appetite, weight loss, or overeating -   Feeling bad about yourself - or that you are a failure or have let yourself or your family down -   Trouble concentrating on things such as school, work, reading, or watching TV -   Moving or speaking so slowly that other people could have noticed; or the opposite being so fidgety that others notice -   Thoughts of being better off dead, or hurting yourself in some way -   PHQ 9 Score -   How difficult have these problems made it for you to do your work, take care of your home and get along with others -       Alcohol Risk Screen    Do you average more than 1 drink per night or more than 7 drinks a week:  No    On any one occasion in the past three months have you have had more than 3 drinks containing alcohol:  No        Functional Ability and Level of Safety    Hearing: Hearing is good. Activities of Daily Living: The home contains: no safety equipment. Patient does total self care      Ambulation: with no difficulty     Fall Risk:  Fall Risk Assessment, last 12 mths 10/15/2020   Able to walk? Yes   Fall in past 12 months? No      Abuse Screen:  Patient is not abused       Cognitive Screening    Has your family/caregiver stated any concerns about your memory: no     Cognitive Screening: na    Functional Ability:   Does the patient exhibit a steady gait? yes   How long did it take the patient to get up and walk from a sitting position? 2sec   Is the patient self reliant?  (ie can do own laundry, meals, household chores)  yes     Does the patient handle his/her own medications? yes     Does the patient handle his/her own money? yes     Is the patients home safe (ie good lighting, handrails on stairs and bath, etc.)? yes     Did you notice or did patient express any hearing difficulties? no     Did you notice or did patient express any vision difficulties?   no     Were distance and reading eye charts used? no       Advance Care Planning:   Patient was offered the opportunity to discuss advance care planning:  yes     Does patient have an Advance Directive:  no   If no, did you provide information on Caring Connections?   yes     ADL Assessment 10/15/2020   Feeding yourself No Help Needed   Getting from bed to chair No Help Needed   Getting dressed No Help Needed   Bathing or showering No Help Needed   Walk across the room (includes cane/walker) No Help Needed   Using the telphone No Help Needed   Taking your medications No Help Needed   Preparing meals No Help Needed   Managing money (expenses/bills) No Help Needed   Moderately strenuous housework (laundry) No Help Needed   Shopping for personal items (toiletries/medicines) No Help Needed   Shopping for groceries No Help Needed   Driving No Help Needed   Climbing a flight of stairs No Help Needed   Getting to places beyond walking distances No Help Needed       Abuse Screening Questionnaire 10/15/2020   Do you ever feel afraid of your partner? N   Are you in a relationship with someone who physically or mentally threatens you? N   Is it safe for you to go home?  Y       Health Maintenance Due     Health Maintenance Due   Topic Date Due    Colorectal Cancer Screening Combo  10/03/2020    Shingrix Vaccine Age 50> (2 of 2) 11/16/2020    Lipid Screen  06/03/2021    Breast Cancer Screen Mammogram  08/08/2021       Patient Care Team   Patient Care Team:  Payal Menon MD as PCP - General (Family Medicine)  Payal Menon MD as PCP - [address] [place] [address] Dr Johnston Provider    History     Patient Active Problem List   Diagnosis Code    Hyperlipidemia E78.5    Hypothyroidism E03.9    Hiatal hernia K44.9    GERD (gastroesophageal reflux disease) K21.9    Advance directive discussed with patient Z71.89     Past Medical History:   Diagnosis Date    Anxiety     Depression     GERD (gastroesophageal reflux disease)     Hiatal hernia     resolved    Hyperlipidemia     resolved    Hypothyroidism     resolved      Past Surgical History:   Procedure Laterality Date    ENDOSCOPY, COLON, DIAGNOSTIC      HX APPENDECTOMY      HX CATARACT REMOVAL  01/2017    right eye    HX CATARACT REMOVAL Left 2020    HX COLONOSCOPY  4772,3770    q5y    HX ORTHOPAEDIC Right 2012    Rotator cuff    HX OTHER SURGICAL  1/16    stress echo    HX OTHER SURGICAL  07/08/2016    gastric sleeve    HX PO AND BSO       Current Outpatient Medications   Medication Sig Dispense Refill    vit A/vit C/vit E/zinc/copper (PRESERVISION AREDS PO) Take  by mouth.  citalopram (CELEXA) 40 mg tablet Take 1 Tablet by mouth daily. 90 Tablet 0    fluticasone propionate (FLONASE) 50 mcg/actuation nasal spray 2 Sprays by Both Nostrils route daily. 1 Bottle 0    flintstones complete (FLINTSTONES COMPLETE, IRON,) chewable tablet Take 2 Tabs by mouth daily.  calcium-cholecalciferol, d3, (CALCIUM 600 + D) 600-125 mg-unit tab Take  by mouth two (2) times a day.  EPINEPHrine (EPIPEN 2-MIN) 0.3 mg/0.3 mL (1:1,000) injection 0.3 mL by IntraMUSCular route once as needed for up to 1 dose. 1 Each prn     Allergies   Allergen Reactions    Venom-Honey Bee Swelling and Unknown (comments)    Codeine Other (comments)     Extreme fatigue, very sleepy, not function well       Family History   Problem Relation Age of Onset    Cancer Mother         colon    Cancer Father         lungs and into brain    Hypertension Sister     Kidney Disease Sister     Heart Disease Brother     Cancer Son      Social History     Tobacco Use    Smoking status: Former Smoker     Packs/day: 1.00     Years: 20.00     Pack years: 20.00     Quit date: 3/1/1993     Years since quittin.6    Smokeless tobacco: Never Used   Substance Use Topics    Alcohol use:  Yes     Alcohol/week: 0.0 standard drinks     Comment: maybe once a month         Spenser Clancy LPN

## 2021-10-18 NOTE — ACP (ADVANCE CARE PLANNING)
Non-Provider Advance Care Planning (ACP) Note    Date of ACP Conversation: 10/18/2021  Persons included in Conversation: patient  Length of ACP Conversation in minutes: <16 minutes (Non-Billable)    Conversation requested by:   Provider    Authorized Decision Maker (if patient is incapable of making informed decisions):    This person is:  Next of Kin by law (only applies in absence of a Healthcare Power of  or Legal Guardian)      Primary Decision Maker: Denis Melendez Dr - [phone]    General ACP for ALL Patients with Decision Making Capacity:    Advance Directive Conversation with Patients who have not yet planned:  Importance of advance care planning, including choosing a healthcare agent to communicate patient's healthcare decisions if patient lost the ability to make decisions, such as after a sudden illness or accident    Review of Existing Advance Directive: (Select questions covered)  na    Interventions Provided:  Provided ACP educational materials:  Conversation Starter Kit  Advance Directive Form

## 2021-10-18 NOTE — PATIENT INSTRUCTIONS

## 2021-10-18 NOTE — PROGRESS NOTES
Helder Carr is a 69 y.o. female who presents to the office today with the following:  Chief Complaint   Patient presents with   [facility] Annual Wellness Visit            HPI  Needs refill   And wants higher dose  Taking Celexa 20  Taking it for Depression after son passed and anxiety at first  Want to sleep more  And gained weight  No SE with it and helped in the past      HM reviewed    Review of Systems   Constitutional: Positive for malaise/fatigue. Negative for weight loss. HENT: Positive for congestion. Respiratory: Positive for cough (occ from sinus drainage). Cardiovascular: Negative for orthopnea. Gastrointestinal: Negative for abdominal pain. Genitourinary: Negative. Musculoskeletal: Negative for joint pain. Neurological: Negative for dizziness and headaches. Psychiatric/Behavioral: Positive for depression. Negative for hallucinations and suicidal ideas. The patient is not nervous/anxious and does not have insomnia. See HPI. Past Medical History:   Diagnosis Date    Anxiety     Depression     GERD (gastroesophageal reflux disease)     Hiatal hernia     resolved    Hyperlipidemia     resolved    Hypothyroidism     resolved       Past Surgical History:   Procedure Laterality Date    ENDOSCOPY, COLON, DIAGNOSTIC      HX APPENDECTOMY      HX CATARACT REMOVAL  01/2017    right eye    HX CATARACT REMOVAL Left 2020    HX COLONOSCOPY  5773,2415    q5y    HX ORTHOPAEDIC Right 2012    Rotator cuff    HX OTHER SURGICAL  1/16    stress echo    HX OTHER SURGICAL  07/08/2016    gastric sleeve    HX PO AND BSO         Allergies   Allergen Reactions    Venom-Honey Bee Swelling and Unknown (comments)    Codeine Other (comments)     Extreme fatigue, very sleepy, not function well       Current Outpatient Medications   Medication Sig    vit A/vit C/vit E/zinc/copper (PRESERVISION AREDS PO) Take  by mouth.  citalopram (CELEXA) 40 mg tablet Take 1 Tablet by mouth daily.     fluticasone propionate (FLONASE) 50 mcg/actuation nasal spray 2 Sprays by Both Nostrils route daily.  flintstones complete (FLINTSTONES COMPLETE, IRON,) chewable tablet Take 2 Tabs by mouth daily.  calcium-cholecalciferol, d3, (CALCIUM 600 + D) 600-125 mg-unit tab Take  by mouth two (2) times a day.  EPINEPHrine (EPIPEN 2-MIN) 0.3 mg/0.3 mL (1:1,000) injection 0.3 mL by IntraMUSCular route once as needed for up to 1 dose. No current facility-administered medications for this visit. Social History     Socioeconomic History    Marital status:      Spouse name: Not on file    Number of children: Not on file    Years of education: Not on file    Highest education level: Not on file   Tobacco Use    Smoking status: Former Smoker     Packs/day: 1.00     Years: 20.00     Pack years: 20.00     Quit date: 3/1/1993     Years since quittin.6    Smokeless tobacco: Never Used   Substance and Sexual Activity    Alcohol use: Yes     Alcohol/week: 0.0 standard drinks     Comment: maybe once a month    Drug use: No    Sexual activity: Yes     Partners: Male     Social Determinants of Health     Financial Resource Strain:     Difficulty of Paying Living Expenses:    Food Insecurity:     Worried About Running Out of Food in the Last Year:     [address] in the Last Year:    Transportation Needs:     Lack of Transportation (Medical):      Lack of Transportation (Non-Medical):    Physical Activity:     Days of Exercise per Week:     Minutes of Exercise per Session:    Stress:     Feeling of Stress :    Social Connections:     Frequency of Communication with Friends and Family:     Frequency of Social Gatherings with Friends and Family:     Attends Anabaptism Services:     Active Member of Clubs or Organizations:     Attends Club or Organization Meetings:     Marital Status:        Family History   Problem Relation Age of Onset    Cancer Mother         colon    Cancer Father lungs and into brain    Hypertension Sister     Kidney Disease Sister     Heart Disease Brother     Cancer Son          Physical Exam:  Visit Vitals  /77   Pulse (!) 53   Temp 97.3 °F (36.3 °C)   Resp 14   Ht 5' 3\" (1.6 m)   Wt 183 lb (83 kg)   SpO2 96%   BMI 32.42 kg/m²     Physical Exam  Vitals and nursing note reviewed. HENT:      Head: Atraumatic. Right Ear: Tympanic membrane, ear canal and external ear normal.      Left Ear: Tympanic membrane, ear canal and external ear normal.   Eyes:      Conjunctiva/sclera: Conjunctivae normal.   Cardiovascular:      Rate and Rhythm: Normal rate and regular rhythm. Pulses: Normal pulses. Heart sounds: Normal heart sounds. Pulmonary:      Effort: Pulmonary effort is normal.      Breath sounds: Normal breath sounds. Abdominal:      General: Abdomen is flat. There is no distension. Palpations: Abdomen is soft. Tenderness: There is no abdominal tenderness. There is no right CVA tenderness, left CVA tenderness or guarding. Musculoskeletal:      Right lower leg: No edema. Left lower leg: No edema. Lymphadenopathy:      Cervical: No cervical adenopathy. Skin:     General: Skin is warm and dry. Neurological:      Mental Status: She is alert and oriented to person, place, and time. Psychiatric:         Mood and Affect: Mood normal.         Behavior: Behavior normal.         Assessment/Plan:    ICD-10-CM ICD-9-CM    1. Depression, unspecified depression type  F32. A 311 CBC WITH AUTOMATED DIFF      METABOLIC PANEL, COMPREHENSIVE      citalopram (CELEXA) 40 mg tablet      CBC WITH AUTOMATED DIFF      METABOLIC PANEL, COMPREHENSIVE   2. Screening for cardiovascular condition  Z13.6 V81.2 LIPID PANEL      COLLECTION VENOUS BLOOD,VENIPUNCTURE      LIPID PANEL   3. Colon cancer screening  Z12.11 V76.51 COLOGUARD TEST (FECAL DNA COLORECTAL CANCER SCREENING)   4.  Encounter for screening mammogram for malignant neoplasm of breast Z12.31 V76.12 [facility] 3D ABRAM W MAMMO BI SCREENING INCL CAD   5. Medicare annual wellness visit, subsequent  Z00.00 V70.0      Celexa increased to 40 mg  Recheck in 1 mo    Follow-up and Dispositions    · Return in about 4 weeks (around 11/15/2021).          Johnathan Ames MD

## 2021-10-19 NOTE — PROGRESS NOTES
Call pt, the CBC is normal  The Chol is high, try a low Chol diet avoiding red meats, animal fats and high fat diary and recheck in 3 mo  The electrolytes are ok  The kidney and liver tests and Glucose are normal

## 2021-10-19 NOTE — PROGRESS NOTES
Spoke with patient after verifying Name, and , informed patient of lab results and recommendations per Dr. Rosa Schwarz . Patient given an opportunity to ask questions, repeated information, and verbalized understanding.

## 2021-10-29 ENCOUNTER — OFFICE VISIT (OUTPATIENT)
Dept: FAMILY MEDICINE CLINIC | Age: 71
End: 2021-10-29
Payer: MEDICARE

## 2021-10-29 VITALS
SYSTOLIC BLOOD PRESSURE: 119 MMHG | WEIGHT: 186 LBS | DIASTOLIC BLOOD PRESSURE: 72 MMHG | RESPIRATION RATE: 14 BRPM | TEMPERATURE: 97.2 F | BODY MASS INDEX: 32.96 KG/M2 | HEART RATE: 67 BPM | OXYGEN SATURATION: 98 % | HEIGHT: 63 IN

## 2021-10-29 DIAGNOSIS — N30.00 ACUTE CYSTITIS WITHOUT HEMATURIA: ICD-10-CM

## 2021-10-29 DIAGNOSIS — R30.0 DYSURIA: Primary | ICD-10-CM

## 2021-10-29 LAB
BILIRUB UR QL STRIP: NEGATIVE
GLUCOSE UR-MCNC: NEGATIVE MG/DL
KETONES P FAST UR STRIP-MCNC: NEGATIVE MG/DL
PH UR STRIP: 5.5 [PH] (ref 4.6–8)
PROT UR QL STRIP: NEGATIVE
SP GR UR STRIP: 1.02 (ref 1–1.03)
UA UROBILINOGEN AMB POC: NORMAL (ref 0.2–1)
URINALYSIS CLARITY POC: NORMAL
URINALYSIS COLOR POC: YELLOW
URINE BLOOD POC: NORMAL
URINE LEUKOCYTES POC: NORMAL
URINE NITRITES POC: NEGATIVE

## 2021-10-29 PROCEDURE — 81003 URINALYSIS AUTO W/O SCOPE: CPT | Performed by: FAMILY MEDICINE

## 2021-10-29 PROCEDURE — 99213 OFFICE O/P EST LOW 20 MIN: CPT | Performed by: FAMILY MEDICINE

## 2021-10-29 RX ORDER — SULFAMETHOXAZOLE AND TRIMETHOPRIM 800; 160 MG/1; MG/1
1 TABLET ORAL 2 TIMES DAILY
Qty: 14 TABLET | Refills: 0 | Status: SHIPPED | OUTPATIENT
Start: 2021-10-29 | End: 2021-11-05

## 2021-10-29 NOTE — PROGRESS NOTES
1. Have you been to the ER, urgent care clinic since your last visit? Hospitalized since your last visit? No    2. Have you seen or consulted any other health care providers outside of the [address] [place] [address] since your last visit? Include any pap smears or colon screening. No     10/29/2021      Chief Complaint   Patient presents with    Urinary Frequency    Urinary Burning         History of Present Illness:        Narayan Brothers is a 69 y.o. female with two days of dysuria urgency and frequency. No fever or flank pain. Allergies   Allergen Reactions    Venom-Honey Bee Swelling and Unknown (comments)    Codeine Other (comments)     Extreme fatigue, very sleepy, not function well       Current Outpatient Medications   Medication Sig    trimethoprim-sulfamethoxazole (BACTRIM DS, SEPTRA DS) 160-800 mg per tablet Take 1 Tablet by mouth two (2) times a day for 7 days.  vit A/vit C/vit E/zinc/copper (PRESERVISION AREDS PO) Take  by mouth.  citalopram (CELEXA) 40 mg tablet Take 1 Tablet by mouth daily.  fluticasone propionate (FLONASE) 50 mcg/actuation nasal spray 2 Sprays by Both Nostrils route daily.  flintstones complete (FLINTSTONES COMPLETE, IRON,) chewable tablet Take 2 Tabs by mouth daily.  calcium-cholecalciferol, d3, (CALCIUM 600 + D) 600-125 mg-unit tab Take  by mouth two (2) times a day.  EPINEPHrine (EPIPEN 2-MIN) 0.3 mg/0.3 mL (1:1,000) injection 0.3 mL by IntraMUSCular route once as needed for up to 1 dose. No current facility-administered medications for this visit. Physical Examination:    Visit Vitals  /72 (BP 1 Location: Left upper arm, BP Patient Position: Sitting, BP Cuff Size: Adult)   Pulse 67   Temp 97.2 °F (36.2 °C) (Oral)   Resp 14   Ht 5' 3\" (1.6 m)   Wt 186 lb (84.4 kg)   SpO2 98%   BMI 32.95 kg/m²      General:  Alert, cooperative, no distress. HEENT:  Normocephalic, without obvious abnormality, atraumatic. Conjunctivae/corneas clear.  Pupils equal, round, reactive to light. Extraocular movements intact. TMs and external canals normal bilaterally. Nasal mucosa and oropharynx clear. Lungs: Clear to auscultation bilaterally. Chest wall:  No tenderness or deformity. Heart:  Regular rate and rhythm, S1, S2 normal, no murmur, click, rub, or gallop. Abdomen:   Soft, non-tender. Bowel sounds normal. No masses. No organomegaly. Extremities: Extremities normal, atraumatic, no cyanosis or edema. Pulses: 2+ and symmetric all extremities. Skin: Skin color, texture, turgor normal. No rashes or lesions. Lymph nodes: Cervical, supraclavicular, and axillary nodes normal.   Neurologic: CNII-XII intact. Normal strength, sensation, and reflexes throughout. Results for orders placed or performed in visit on 10/29/21   AMB POC URINALYSIS DIP STICK AUTO W/O MICRO   Result Value Ref Range    Color (UA POC) Yellow     Clarity (UA POC) Cloudy     Glucose (UA POC) Negative Negative    Bilirubin (UA POC) Negative Negative    Ketones (UA POC) Negative Negative    Specific gravity (UA POC) 1.020 1.001 - 1.035    Blood (UA POC) 1+ Negative    pH (UA POC) 5.5 4.6 - 8.0    Protein (UA POC) Negative Negative    Urobilinogen (UA POC) 0.2 mg/dL 0.2 - 1    Nitrites (UA POC) Negative Negative    Leukocyte esterase (UA POC) 2+ Negative         ASSESSMENT AND PLAN    1. Dysuria    - AMB POC URINALYSIS DIP STICK AUTO W/O MICRO  - trimethoprim-sulfamethoxazole (BACTRIM DS, SEPTRA DS) 160-800 mg per tablet; Take 1 Tablet by mouth two (2) times a day for 7 days. Dispense: 14 Tablet; Refill: 0    2. Acute cystitis without hematuria  Empiric abx  - trimethoprim-sulfamethoxazole (BACTRIM DS, SEPTRA DS) 160-800 mg per tablet; Take 1 Tablet by mouth two (2) times a day for 7 days. Dispense: 14 Tablet;  Refill: 0        Orders Placed This Encounter    AMB POC URINALYSIS DIP STICK AUTO W/O MICRO     AMB POC URINALYSIS DIP STICK AUTO W/O    trimethoprim-sulfamethoxazole (BACTRIM DS, SEPTRA DS) 160-800 mg per tablet     Sig: Take 1 Tablet by mouth two (2) times a day for 7 days.      Dispense:  14 Tablet     Refill:  0           Ricardo Mcallister MD

## 2021-11-15 ENCOUNTER — TELEPHONE (OUTPATIENT)
Dept: FAMILY MEDICINE CLINIC | Age: 71
End: 2021-11-15

## 2021-11-15 DIAGNOSIS — R19.5 POSITIVE COLORECTAL CANCER SCREENING USING COLOGUARD TEST: Primary | ICD-10-CM

## 2021-11-15 NOTE — TELEPHONE ENCOUNTER
I have spoken to the pt re her positive Cologuard test  She needs a Colonoscopy  Will refer pt to Dr Doris Bahena

## 2021-11-18 ENCOUNTER — OFFICE VISIT (OUTPATIENT)
Dept: FAMILY MEDICINE CLINIC | Age: 71
End: 2021-11-18
Payer: MEDICARE

## 2021-11-18 VITALS
BODY MASS INDEX: 32.78 KG/M2 | RESPIRATION RATE: 14 BRPM | SYSTOLIC BLOOD PRESSURE: 98 MMHG | TEMPERATURE: 98 F | WEIGHT: 185 LBS | DIASTOLIC BLOOD PRESSURE: 64 MMHG | HEIGHT: 63 IN | OXYGEN SATURATION: 95 %

## 2021-11-18 DIAGNOSIS — F33.8 SEASONAL DEPRESSION (HCC): Primary | ICD-10-CM

## 2021-11-18 DIAGNOSIS — E78.2 MIXED HYPERLIPIDEMIA: ICD-10-CM

## 2021-11-18 DIAGNOSIS — R19.5 POSITIVE COLORECTAL CANCER SCREENING USING COLOGUARD TEST: ICD-10-CM

## 2021-11-18 PROCEDURE — 99213 OFFICE O/P EST LOW 20 MIN: CPT | Performed by: FAMILY MEDICINE

## 2021-11-18 RX ORDER — CITALOPRAM 40 MG/1
40 TABLET, FILM COATED ORAL DAILY
Qty: 90 TABLET | Refills: 1 | Status: SHIPPED | OUTPATIENT
Start: 2021-11-18 | End: 2022-08-09

## 2021-11-18 NOTE — PROGRESS NOTES
Claudio Louis is a 69 y.o. female who presents to the office today with the following:  No chief complaint on file. HPI  Depression  On Celexa 40 mg now  Helping some   More active  No SE with it    Has not gotten apt with Dr Kaylah Monterroso yet  Office was closed    Last Chol was high  Is working on diet   Due for recheck in 3 mo    ROS  See HPI. Past Medical History:   Diagnosis Date    Anxiety     Depression     GERD (gastroesophageal reflux disease)     Hiatal hernia     resolved    Hyperlipidemia     Hypothyroidism     resolved       Past Surgical History:   Procedure Laterality Date    ENDOSCOPY, COLON, DIAGNOSTIC      HX APPENDECTOMY      HX CATARACT REMOVAL  01/2017    right eye    HX CATARACT REMOVAL Left 2020    HX COLONOSCOPY  8489,3481    q5y    HX ORTHOPAEDIC Right 2012    Rotator cuff    HX OTHER SURGICAL  1/16    stress echo    HX OTHER SURGICAL  07/08/2016    gastric sleeve    HX PO AND BSO         Allergies   Allergen Reactions    Venom-Honey Bee Swelling and Unknown (comments)    Codeine Other (comments)     Extreme fatigue, very sleepy, not function well       Current Outpatient Medications   Medication Sig    citalopram (CELEXA) 40 mg tablet Take 1 Tablet by mouth daily.  vit A/vit C/vit E/zinc/copper (PRESERVISION AREDS PO) Take  by mouth.  fluticasone propionate (FLONASE) 50 mcg/actuation nasal spray 2 Sprays by Both Nostrils route daily.  flintstones complete (FLINTSTONES COMPLETE, IRON,) chewable tablet Take 2 Tabs by mouth daily.  calcium-cholecalciferol, d3, (CALCIUM 600 + D) 600-125 mg-unit tab Take  by mouth two (2) times a day.  EPINEPHrine (EPIPEN 2-MIN) 0.3 mg/0.3 mL (1:1,000) injection 0.3 mL by IntraMUSCular route once as needed for up to 1 dose. No current facility-administered medications for this visit.        Social History     Socioeconomic History    Marital status:    Tobacco Use    Smoking status: Former Smoker     Packs/day: 1.00 Years: 20.00     Pack years: 20.00     Quit date: 3/1/1993     Years since quittin.7    Smokeless tobacco: Never Used   Vaping Use    Vaping Use: Never used   Substance and Sexual Activity    Alcohol use: Yes     Alcohol/week: 0.0 standard drinks     Comment: maybe once a month    Drug use: No    Sexual activity: Yes     Partners: Male       Family History   Problem Relation Age of Onset    Cancer Mother         colon    Cancer Father         lungs and into brain    Hypertension Sister     Kidney Disease Sister     Heart Disease Brother     Cancer Son          Physical Exam:  Visit Vitals  BP 98/64   Temp 98 °F (36.7 °C)   Resp 14   Ht 5' 3\" (1.6 m)   Wt 185 lb (83.9 kg)   SpO2 95%   BMI 32.77 kg/m²     Physical Exam  Vitals and nursing note reviewed. HENT:      Head: Normocephalic and atraumatic. Eyes:      Extraocular Movements: Extraocular movements intact. Conjunctiva/sclera: Conjunctivae normal.   Cardiovascular:      Rate and Rhythm: Normal rate and regular rhythm. Heart sounds: Normal heart sounds. Pulmonary:      Effort: Pulmonary effort is normal.      Breath sounds: Normal breath sounds. Musculoskeletal:      Right lower leg: No edema. Left lower leg: No edema. Neurological:      Mental Status: She is alert and oriented to person, place, and time. Psychiatric:         Mood and Affect: Mood normal.         Behavior: Behavior normal.         Assessment/Plan:    ICD-10-CM ICD-9-CM    1. Seasonal depression (HCC)  F33.8 296.99 citalopram (CELEXA) 40 mg tablet   2. Positive colorectal cancer screening using Cologuard test  R19.5 787.7 F/U with Dr Sloan Brown   3. Mixed hyperlipidemia  E78.2 272.2 Recheck BW in 3 mo       Follow-up and Dispositions    · Return in about 3 months (around 2022).          Alfred Delatorre MD

## 2021-11-18 NOTE — PROGRESS NOTES
1. Have you been to the ER, urgent care clinic since your last visit? Hospitalized since your last visit? No    2. Have you seen or consulted any other health care providers outside of the [address] [place] [address] since your last visit? Include any pap smears or colon screening.  No

## 2021-11-23 DIAGNOSIS — Z12.31 ENCOUNTER FOR SCREENING MAMMOGRAM FOR MALIGNANT NEOPLASM OF BREAST: ICD-10-CM

## 2022-02-17 ENCOUNTER — OFFICE VISIT (OUTPATIENT)
Dept: FAMILY MEDICINE CLINIC | Age: 72
End: 2022-02-17
Payer: MEDICARE

## 2022-02-17 VITALS
HEIGHT: 65 IN | RESPIRATION RATE: 14 BRPM | BODY MASS INDEX: 30.82 KG/M2 | DIASTOLIC BLOOD PRESSURE: 67 MMHG | SYSTOLIC BLOOD PRESSURE: 118 MMHG | OXYGEN SATURATION: 96 % | TEMPERATURE: 97.4 F | WEIGHT: 185 LBS | HEART RATE: 57 BPM

## 2022-02-17 DIAGNOSIS — F33.8 SEASONAL DEPRESSION (HCC): ICD-10-CM

## 2022-02-17 DIAGNOSIS — E78.2 MIXED HYPERLIPIDEMIA: Primary | ICD-10-CM

## 2022-02-17 DIAGNOSIS — Z13.6 SCREENING FOR CARDIOVASCULAR CONDITION: ICD-10-CM

## 2022-02-17 DIAGNOSIS — R19.5 POSITIVE COLORECTAL CANCER SCREENING USING COLOGUARD TEST: ICD-10-CM

## 2022-02-17 PROCEDURE — 99214 OFFICE O/P EST MOD 30 MIN: CPT | Performed by: FAMILY MEDICINE

## 2022-02-17 NOTE — PROGRESS NOTES
1. Have you been to the ER, urgent care clinic since your last visit? Hospitalized since your last visit? No    2. Have you seen or consulted any other health care providers outside of the [address] [place] [address] since your last visit? Include any pap smears or colon screening.  No

## 2022-02-17 NOTE — PROGRESS NOTES
Shivam Howard is a 66 y.o. female who presents to the office today with the following:  Chief Complaint   Patient presents with    Medication Evaluation       HPI  Hyperlipidemia  Tried Low Chol diet  Here for recheck  Not on med  Fasting tod    Cologuard test was positive  Pt referred to Dr Sincere VEGA. Twinabby Veliz  Did not have Colonoscopy, was sick and will have Barium enema March 4th  Mother had Colon Ca in her 63's    Depression  On Celexa 40 now  Getting up and around and about now  No SE with higher dose    Review of Systems   Constitutional: Negative for fever and weight loss. HENT: Negative for congestion. Eyes: Negative for blurred vision. Respiratory: Positive for cough (little in am). Negative for shortness of breath. Cardiovascular: Negative for chest pain. Gastrointestinal: Negative for abdominal pain, blood in stool and melena. Genitourinary: Negative for dysuria and frequency. Neurological: Negative for dizziness and headaches. Endo/Heme/Allergies: Negative for polydipsia. Psychiatric/Behavioral: Negative for depression. The patient is not nervous/anxious and does not have insomnia. See HPI.     Past Medical History:   Diagnosis Date    Anxiety     Depression     GERD (gastroesophageal reflux disease)     Hiatal hernia     resolved    Hyperlipidemia     Hypothyroidism     resolved       Past Surgical History:   Procedure Laterality Date    ENDOSCOPY, COLON, DIAGNOSTIC      HX APPENDECTOMY      HX CATARACT REMOVAL  01/2017    right eye    HX CATARACT REMOVAL Left 2020    HX COLONOSCOPY  [id number]    q5y    HX ORTHOPAEDIC Right 2012    Rotator cuff    HX OTHER SURGICAL  1/16    stress echo    HX OTHER SURGICAL  07/08/2016    gastric sleeve    HX PO AND BSO         Allergies   Allergen Reactions    Venom-Honey Bee Swelling and Unknown (comments)    Codeine Other (comments)     Extreme fatigue, very sleepy, not function well       Current Outpatient Medications   Medication Sig  citalopram (CELEXA) 40 mg tablet Take 1 Tablet by mouth daily.  vit A/vit C/vit E/zinc/copper (PRESERVISION AREDS PO) Take  by mouth.  fluticasone propionate (FLONASE) 50 mcg/actuation nasal spray 2 Sprays by Both Nostrils route daily.  flintstones complete (FLINTSTONES COMPLETE, IRON,) chewable tablet Take 2 Tabs by mouth daily.  calcium-cholecalciferol, d3, (CALCIUM 600 + D) 600-125 mg-unit tab Take  by mouth two (2) times a day.  EPINEPHrine (EPIPEN 2-MIN) 0.3 mg/0.3 mL (1:1,000) injection 0.3 mL by IntraMUSCular route once as needed for up to 1 dose. No current facility-administered medications for this visit. Social History     Socioeconomic History    Marital status:    Tobacco Use    Smoking status: Former Smoker     Packs/day: 1.00     Years: 20.00     Pack years: 20.00     Quit date: 3/1/1993     Years since quittin.9    Smokeless tobacco: Never Used   Vaping Use    Vaping Use: Never used   Substance and Sexual Activity    Alcohol use: Yes     Alcohol/week: 0.0 standard drinks     Comment: maybe once a month    Drug use: No    Sexual activity: Yes     Partners: Male       Family History   Problem Relation Age of Onset    Cancer Mother         colon    Cancer Father         lungs and into brain    Hypertension Sister     Kidney Disease Sister     Heart Disease Brother     Cancer Son          Physical Exam:  Visit Vitals  /67 (BP 1 Location: Left upper arm, BP Patient Position: At rest)   Pulse (!) 57   Temp 97.4 °F (36.3 °C)   Resp 14   Ht 5' 5\" (1.651 m)   Wt 185 lb (83.9 kg)   SpO2 96%   BMI 30.79 kg/m²     Physical Exam  Vitals and nursing note reviewed. Constitutional:       Appearance: She is obese. HENT:      Head: Normocephalic and atraumatic.       Right Ear: Tympanic membrane, ear canal and external ear normal.      Left Ear: Tympanic membrane, ear canal and external ear normal.   Eyes:      Extraocular Movements: Extraocular movements intact. Conjunctiva/sclera: Conjunctivae normal.   Cardiovascular:      Rate and Rhythm: Normal rate and regular rhythm. Heart sounds: Normal heart sounds. Pulmonary:      Effort: Pulmonary effort is normal.      Breath sounds: Normal breath sounds. Abdominal:      General: There is no distension. Palpations: Abdomen is soft. Tenderness: There is no abdominal tenderness. There is no right CVA tenderness, left CVA tenderness or guarding. Musculoskeletal:      Right lower leg: No edema. Left lower leg: No edema. Lymphadenopathy:      Cervical: No cervical adenopathy. Skin:     General: Skin is warm and dry. Neurological:      Mental Status: She is alert and oriented to person, place, and time. Psychiatric:         Mood and Affect: Mood normal.         Behavior: Behavior normal.         Assessment/Plan:    ICD-10-CM ICD-9-CM    1. Mixed hyperlipidemia  E78.2 272.2 LIPID PANEL   2. Seasonal depression (HCC)  F33.8 296.99 Cont Celexa 40 and F/U in about 6 mo   3.  Positive colorectal cancer screening using Cologuard test  R19.5 787.7 F/U with the Barium enema and Dr Luther Mahoney MD

## 2022-02-18 LAB
CHOLEST SERPL-MCNC: 247 MG/DL
HDLC SERPL-MCNC: 61 MG/DL
HDLC SERPL: 4 {RATIO} (ref 0–5)
LDLC SERPL CALC-MCNC: 154 MG/DL (ref 0–100)
TRIGL SERPL-MCNC: 160 MG/DL (ref ?–150)
VLDLC SERPL CALC-MCNC: 32 MG/DL

## 2022-02-18 NOTE — PROGRESS NOTES
I have called and left a message for this patient to return my call to discuss her lab results and Dr Buck [place] recommendations.

## 2022-02-18 NOTE — PROGRESS NOTES
Call pt, the Chol is better, still a little up  Continue a low Chol diet avoiding animal fats  And recheck in 6 mo

## 2022-02-18 NOTE — PROGRESS NOTES
Patient returns my call. I have given her the lab results and recommendations from Dr Zohaib Polk review. Patient verbalizes understanding.

## 2022-06-07 ENCOUNTER — OFFICE VISIT (OUTPATIENT)
Dept: FAMILY MEDICINE CLINIC | Age: 72
End: 2022-06-07
Payer: MEDICARE

## 2022-06-07 VITALS
SYSTOLIC BLOOD PRESSURE: 95 MMHG | BODY MASS INDEX: 32.8 KG/M2 | RESPIRATION RATE: 14 BRPM | OXYGEN SATURATION: 97 % | HEART RATE: 54 BPM | DIASTOLIC BLOOD PRESSURE: 60 MMHG | WEIGHT: 185.13 LBS | TEMPERATURE: 96.9 F | HEIGHT: 63 IN

## 2022-06-07 DIAGNOSIS — N30.00 ACUTE CYSTITIS WITHOUT HEMATURIA: ICD-10-CM

## 2022-06-07 DIAGNOSIS — R30.0 DYSURIA: Primary | ICD-10-CM

## 2022-06-07 LAB
BILIRUB UR QL STRIP: NEGATIVE
GLUCOSE UR-MCNC: NEGATIVE MG/DL
KETONES P FAST UR STRIP-MCNC: NEGATIVE MG/DL
PH UR STRIP: 5.5 [PH] (ref 4.6–8)
PROT UR QL STRIP: NEGATIVE
SP GR UR STRIP: 1.01 (ref 1–1.03)
UA UROBILINOGEN AMB POC: NORMAL (ref 0.2–1)
URINALYSIS CLARITY POC: CLEAR
URINALYSIS COLOR POC: YELLOW
URINE BLOOD POC: NORMAL
URINE LEUKOCYTES POC: NORMAL
URINE NITRITES POC: POSITIVE

## 2022-06-07 PROCEDURE — 99213 OFFICE O/P EST LOW 20 MIN: CPT | Performed by: FAMILY MEDICINE

## 2022-06-07 PROCEDURE — 81003 URINALYSIS AUTO W/O SCOPE: CPT | Performed by: FAMILY MEDICINE

## 2022-06-07 PROCEDURE — 1101F PT FALLS ASSESS-DOCD LE1/YR: CPT | Performed by: FAMILY MEDICINE

## 2022-06-07 PROCEDURE — G8417 CALC BMI ABV UP PARAM F/U: HCPCS | Performed by: FAMILY MEDICINE

## 2022-06-07 PROCEDURE — G9899 SCRN MAM PERF RSLTS DOC: HCPCS | Performed by: FAMILY MEDICINE

## 2022-06-07 PROCEDURE — G8427 DOCREV CUR MEDS BY ELIG CLIN: HCPCS | Performed by: FAMILY MEDICINE

## 2022-06-07 PROCEDURE — G8510 SCR DEP NEG, NO PLAN REQD: HCPCS | Performed by: FAMILY MEDICINE

## 2022-06-07 PROCEDURE — G8399 PT W/DXA RESULTS DOCUMENT: HCPCS | Performed by: FAMILY MEDICINE

## 2022-06-07 PROCEDURE — 1090F PRES/ABSN URINE INCON ASSESS: CPT | Performed by: FAMILY MEDICINE

## 2022-06-07 PROCEDURE — G8536 NO DOC ELDER MAL SCRN: HCPCS | Performed by: FAMILY MEDICINE

## 2022-06-07 PROCEDURE — 1123F ACP DISCUSS/DSCN MKR DOCD: CPT | Performed by: FAMILY MEDICINE

## 2022-06-07 PROCEDURE — 3017F COLORECTAL CA SCREEN DOC REV: CPT | Performed by: FAMILY MEDICINE

## 2022-06-07 RX ORDER — SULFAMETHOXAZOLE AND TRIMETHOPRIM 800; 160 MG/1; MG/1
1 TABLET ORAL 2 TIMES DAILY
Qty: 14 TABLET | Refills: 0 | Status: SHIPPED | OUTPATIENT
Start: 2022-06-07 | End: 2022-06-14

## 2022-06-07 NOTE — PROGRESS NOTES
1. \"Have you been to the ER, urgent care clinic since your last visit? Hospitalized since your last visit? \" No    2. \"Have you seen or consulted any other health care providers outside of the [address] [place] [address] since your last visit? \" No     3. For patients aged 39-70: Has the patient had a colonoscopy / FIT/ Cologuard? Yes - Care Gap present. Most recent result on file      If the patient is female:    4. For patients aged 41-77: Has the patient had a mammogram within the past 2 years? Yes - Care Gap present. Most recent result on file      5. For patients aged 21-65: Has the patient had a pap smear? NA - based on age or sex     6/7/2022      Chief Complaint   Patient presents with    Urinary Pain         History of Present Illness:        Pam Lin is a 66 y.o. female with onset yesterday of dysuria, urgency and frequency. No fever, flank pain or N/V. Last episode 10/21, responded to TMP/SMX. Allergies   Allergen Reactions    Venom-Honey Bee Swelling and Unknown (comments)    Codeine Other (comments)     Extreme fatigue, very sleepy, not function well       Current Outpatient Medications   Medication Sig    trimethoprim-sulfamethoxazole (BACTRIM DS, SEPTRA DS) 160-800 mg per tablet Take 1 Tablet by mouth two (2) times a day for 7 days.  citalopram (CELEXA) 40 mg tablet Take 1 Tablet by mouth daily.  vit A/vit C/vit E/zinc/copper (PRESERVISION AREDS PO) Take  by mouth.  fluticasone propionate (FLONASE) 50 mcg/actuation nasal spray 2 Sprays by Both Nostrils route daily.  flintstones complete (FLINTSTONES COMPLETE, IRON,) chewable tablet Take 2 Tabs by mouth daily.  calcium-cholecalciferol, d3, (CALCIUM 600 + D) 600-125 mg-unit tab Take  by mouth two (2) times a day.  EPINEPHrine (EPIPEN 2-MIN) 0.3 mg/0.3 mL (1:1,000) injection 0.3 mL by IntraMUSCular route once as needed for up to 1 dose.     COVID-19 mRNA Vacc (MODERNA) 100 mcg/0.5 mL susp injection  (Patient not taking: Reported on 6/7/2022)     No current facility-administered medications for this visit. Physical Examination:    Visit Vitals  BP 95/60 (BP 1 Location: Left upper arm, BP Patient Position: Sitting, BP Cuff Size: Adult)   Pulse (!) 54   Temp 96.9 °F (36.1 °C) (Oral)   Resp 14   Ht 5' 3\" (1.6 m)   Wt 185 lb 2 oz (84 kg)   SpO2 97%   BMI 32.79 kg/m²      General:  Alert, cooperative, no distress. HEENT:  Normocephalic, without obvious abnormality, atraumatic. Conjunctivae/corneas clear. Pupils equal, round, reactive to light. Extraocular movements intact. TMs and external canals normal bilaterally. Nasal mucosa and oropharynx clear. Lungs: Clear to auscultation bilaterally. Chest wall:  No tenderness or deformity. Heart:  Regular rate and rhythm, S1, S2 normal, no murmur, click, rub, or gallop. Abdomen:   Soft, non-tender. Bowel sounds normal. No masses. No organomegaly. Extremities: Extremities normal, atraumatic, no cyanosis or edema. Pulses: 2+ and symmetric all extremities. Skin: Skin color, texture, turgor normal. No rashes or lesions. Lymph nodes: Cervical, supraclavicular, and axillary nodes normal.   Neurologic: CNII-XII intact. Normal strength, sensation, and reflexes throughout. Results for orders placed or performed in visit on 06/07/22   AMB POC URINALYSIS DIP STICK AUTO W/O MICRO   Result Value Ref Range    Color (UA POC) Yellow     Clarity (UA POC) Clear     Glucose (UA POC) Negative Negative    Bilirubin (UA POC) Negative Negative    Ketones (UA POC) Negative Negative    Specific gravity (UA POC) 1.010 1.001 - 1.035    Blood (UA POC) Trace Negative    pH (UA POC) 5.5 4.6 - 8.0    Protein (UA POC) Negative Negative    Urobilinogen (UA POC) 0.2 mg/dL 0.2 - 1    Nitrites (UA POC) Positive Negative    Leukocyte esterase (UA POC) 1+ Negative           ASSESSMENT AND PLAN    1.  Dysuria    - AMB POC URINALYSIS DIP STICK AUTO W/O MICRO  - trimethoprim-sulfamethoxazole (BACTRIM DS, SEPTRA DS) 160-800 mg per tablet; Take 1 Tablet by mouth two (2) times a day for 7 days. Dispense: 14 Tablet; Refill: 0    2. Acute cystitis without hematuria  Empiric abx  - trimethoprim-sulfamethoxazole (BACTRIM DS, SEPTRA DS) 160-800 mg per tablet; Take 1 Tablet by mouth two (2) times a day for 7 days. Dispense: 14 Tablet; Refill: 0            Orders Placed This Encounter    AMB POC URINALYSIS DIP STICK AUTO W/O MICRO     AMB POC URINALYSIS DIP STICK AUTO W/O    COVID-19 mRNA Vacc (MODERNA) 100 mcg/0.5 mL susp injection    trimethoprim-sulfamethoxazole (BACTRIM DS, SEPTRA DS) 160-800 mg per tablet     Sig: Take 1 Tablet by mouth two (2) times a day for 7 days.      Dispense:  14 Tablet     Refill:  0           Cammie Bunn MD

## 2022-09-15 DIAGNOSIS — F33.8 SEASONAL DEPRESSION (HCC): ICD-10-CM

## 2022-09-15 RX ORDER — CITALOPRAM 40 MG/1
TABLET, FILM COATED ORAL
Qty: 15 TABLET | Refills: 0 | Status: SHIPPED | OUTPATIENT
Start: 2022-09-15 | End: 2022-10-04 | Stop reason: SDUPTHER

## 2022-10-04 ENCOUNTER — OFFICE VISIT (OUTPATIENT)
Dept: FAMILY MEDICINE CLINIC | Age: 72
End: 2022-10-04
Payer: MEDICARE

## 2022-10-04 VITALS
HEIGHT: 63 IN | RESPIRATION RATE: 14 BRPM | SYSTOLIC BLOOD PRESSURE: 105 MMHG | BODY MASS INDEX: 32.96 KG/M2 | WEIGHT: 186 LBS | TEMPERATURE: 97.3 F | OXYGEN SATURATION: 95 % | DIASTOLIC BLOOD PRESSURE: 69 MMHG | HEART RATE: 74 BPM

## 2022-10-04 DIAGNOSIS — Z23 ENCOUNTER FOR IMMUNIZATION: Primary | ICD-10-CM

## 2022-10-04 DIAGNOSIS — S03.00XA DISLOCATION OF TEMPOROMANDIBULAR JOINT, INITIAL ENCOUNTER: ICD-10-CM

## 2022-10-04 DIAGNOSIS — F33.8 SEASONAL DEPRESSION (HCC): ICD-10-CM

## 2022-10-04 PROCEDURE — 99213 OFFICE O/P EST LOW 20 MIN: CPT | Performed by: FAMILY MEDICINE

## 2022-10-04 PROCEDURE — G0008 ADMIN INFLUENZA VIRUS VAC: HCPCS | Performed by: FAMILY MEDICINE

## 2022-10-04 PROCEDURE — 90694 VACC AIIV4 NO PRSRV 0.5ML IM: CPT | Performed by: FAMILY MEDICINE

## 2022-10-04 RX ORDER — CITALOPRAM 40 MG/1
40 TABLET, FILM COATED ORAL DAILY
Qty: 90 TABLET | Refills: 3 | Status: SHIPPED | OUTPATIENT
Start: 2022-10-04

## 2022-10-04 NOTE — PROGRESS NOTES
1. \"Have you been to the ER, urgent care clinic since your last visit? Hospitalized since your last visit? \" No    2. \"Have you seen or consulted any other health care providers outside of the [address] [place] [address] since your last visit? \" No     3. For patients aged 39-70: Has the patient had a colonoscopy / FIT/ Cologuard? Yes - no Care Gap present      If the patient is female:    4. For patients aged 41-77: Has the patient had a mammogram within the past 2 years? Yes - no Care Gap present      5. For patients aged 21-65: Has the patient had a pap smear? NA - based on age or sex  10/4/2022      Chief Complaint   Patient presents with    Jaw Pain     Depression              History of Present Illness:         is a 66 y.o. female due for refill of citalopram. Mood OK. Had lipid panel with elevated TC in spring, but ten year risk was 9.2% which only drops to 8.6% with primary prevention with statin. Having some L TMJ discomfort      Allergies   Allergen Reactions    Venom-Honey Bee Swelling and Unknown (comments)    Codeine Other (comments)     Extreme fatigue, very sleepy, not function well       Current Outpatient Medications   Medication Sig    citalopram (CELEXA) 40 mg tablet Take 1 Tablet by mouth daily. vit A/vit C/vit E/zinc/copper (PRESERVISION AREDS PO) Take  by mouth. fluticasone propionate (FLONASE) 50 mcg/actuation nasal spray 2 Sprays by Both Nostrils route daily. flintstones complete (FLINTSTONES) chewable tablet Take 2 Tabs by mouth daily. calcium-cholecalciferol, d3, 600-125 mg-unit tab Take  by mouth two (2) times a day. EPINEPHrine (EPIPEN 2-MIN) 0.3 mg/0.3 mL (1:1,000) injection 0.3 mL by IntraMUSCular route once as needed for up to 1 dose. COVID-19 mRNA Vacc (MODERNA) 100 mcg/0.5 mL susp injection  (Patient not taking: No sig reported)     No current facility-administered medications for this visit.            Physical Examination:    Visit Vitals  /69 (BP 1 Location: Left upper arm, BP Patient Position: Sitting, BP Cuff Size: Adult)   Pulse 74   Temp 97.3 °F (36.3 °C) (Oral)   Resp 14   Ht 5' 3\" (1.6 m)   Wt 186 lb (84.4 kg)   SpO2 95%   BMI 32.95 kg/m²      General:  Alert, cooperative, no distress. HEENT:  Normocephalic, without obvious abnormality, atraumatic. Conjunctivae/corneas clear. Pupils equal, round, reactive to light. Extraocular movements intact. TMs and external canals . normal bilaterally. Nasal mucosa and oropharynx clear. Palpable click at L TMJ   Lungs: Clear to auscultation bilaterally. Chest wall:  No tenderness or deformity. Heart:  Regular rate and rhythm, S1, S2 normal, no murmur, click, rub, or gallop. Abdomen:   Soft, non-tender. Bowel sounds normal. No masses. No organomegaly. Extremities: Extremities normal, atraumatic, no cyanosis or edema. Pulses: 2+ and symmetric all extremities. Skin: Skin color, texture, turgor normal. No rashes or lesions. Lymph nodes: Cervical, supraclavicular, and axillary nodes normal.   Neurologic: CNII-XII intact. Normal strength, sensation, and reflexes throughout. ASSESSMENT AND PLAN    1. Encounter for immunization    - INFLUENZA, FLUAD, (AGE 65 Y+), IM, PF, 0.5 ML    2. Seasonal depression ([facility] [address]  Cont current  - citalopram (CELEXA) 40 mg tablet; Take 1 Tablet by mouth daily. Dispense: 90 Tablet; Refill: 3    3. Dislocation of temporomandibular joint, initial encounter  Cont dental spacer              Orders Placed This Encounter    Influenza, FLUAD, (age 72 y+), IM, PF, 0.5 mL    citalopram (CELEXA) 40 mg tablet     Sig: Take 1 Tablet by mouth daily.      Dispense:  90 Tablet     Refill:  3     Pt needs recheck           Devorah Lisa MD

## 2023-05-18 RX ORDER — CITALOPRAM 40 MG/1
40 TABLET ORAL DAILY
COMMUNITY
Start: 2022-10-04

## 2023-05-18 RX ORDER — FLUTICASONE PROPIONATE 50 MCG
2 SPRAY, SUSPENSION (ML) NASAL DAILY
COMMUNITY
Start: 2020-10-15

## 2023-05-18 RX ORDER — EPINEPHRINE 0.3 MG/.3ML
0.3 INJECTION SUBCUTANEOUS
COMMUNITY
Start: 2015-05-11

## 2023-10-24 RX ORDER — CITALOPRAM 40 MG/1
40 TABLET ORAL DAILY
Qty: 90 TABLET | Refills: 0 | Status: SHIPPED | OUTPATIENT
Start: 2023-10-24

## 2024-01-23 RX ORDER — CITALOPRAM 40 MG/1
40 TABLET ORAL DAILY
Qty: 30 TABLET | Refills: 0 | Status: SHIPPED | OUTPATIENT
Start: 2024-01-23

## 2024-01-23 NOTE — TELEPHONE ENCOUNTER
Requested Prescriptions     Pending Prescriptions Disp Refills    citalopram (CELEXA) 40 MG tablet [Pharmacy Med Name: CITALOPRAM HBR 40 MG TABLET] 90 tablet 0     Sig: take 1 tablet by mouth once daily     LOV 10/4/22  Last lab 2/17/22  Last refill 10/24/23 #0 with 0 RF

## 2024-02-19 NOTE — TELEPHONE ENCOUNTER
Requested Prescriptions     Pending Prescriptions Disp Refills    citalopram (CELEXA) 40 MG tablet [Pharmacy Med Name: CITALOPRAM HBR 40 MG TABLET] 30 tablet 0     Sig: take 1 tablet by mouth once daily     Last seen:  10/4/22    Last filled:      citalopram (CELEXA) 40 MG tablet [[id number]]    Order Details  Dose: 40 mg Route: Oral Frequency: DAILY   Dispense Quantity: 30 tablet Refills: 0    Note to Pharmacy: Pt needs recheck         Sig: take 1 tablet by mouth once daily         Start Date: 01/23/24       Please call patient to make apt for this refill.

## 2024-03-15 RX ORDER — CITALOPRAM 40 MG/1
40 TABLET ORAL DAILY
Qty: 30 TABLET | Refills: 0 | OUTPATIENT
Start: 2024-03-15

## 2024-06-17 ENCOUNTER — OFFICE VISIT (OUTPATIENT)
Dept: FAMILY MEDICINE CLINIC | Age: 74
End: 2024-06-17
Payer: MEDICARE

## 2024-06-17 VITALS
HEART RATE: 60 BPM | HEIGHT: 63 IN | TEMPERATURE: 97.5 F | WEIGHT: 184 LBS | DIASTOLIC BLOOD PRESSURE: 66 MMHG | BODY MASS INDEX: 32.6 KG/M2 | SYSTOLIC BLOOD PRESSURE: 103 MMHG | RESPIRATION RATE: 12 BRPM | OXYGEN SATURATION: 96 %

## 2024-06-17 DIAGNOSIS — F41.9 ANXIETY: ICD-10-CM

## 2024-06-17 DIAGNOSIS — Z12.31 ENCOUNTER FOR SCREENING MAMMOGRAM FOR MALIGNANT NEOPLASM OF BREAST: ICD-10-CM

## 2024-06-17 DIAGNOSIS — F33.8 OTHER RECURRENT DEPRESSIVE DISORDERS (HCC): ICD-10-CM

## 2024-06-17 DIAGNOSIS — E78.5 HYPERLIPIDEMIA, UNSPECIFIED HYPERLIPIDEMIA TYPE: ICD-10-CM

## 2024-06-17 DIAGNOSIS — T63.441A ALLERGIC REACTION TO BEE STING: ICD-10-CM

## 2024-06-17 DIAGNOSIS — E87.5 HYPERKALEMIA: ICD-10-CM

## 2024-06-17 DIAGNOSIS — Z00.00 MEDICARE ANNUAL WELLNESS VISIT, SUBSEQUENT: Primary | ICD-10-CM

## 2024-06-17 PROCEDURE — G8427 DOCREV CUR MEDS BY ELIG CLIN: HCPCS | Performed by: FAMILY MEDICINE

## 2024-06-17 PROCEDURE — 1090F PRES/ABSN URINE INCON ASSESS: CPT | Performed by: FAMILY MEDICINE

## 2024-06-17 PROCEDURE — 1036F TOBACCO NON-USER: CPT | Performed by: FAMILY MEDICINE

## 2024-06-17 PROCEDURE — 36415 COLL VENOUS BLD VENIPUNCTURE: CPT | Performed by: FAMILY MEDICINE

## 2024-06-17 PROCEDURE — G0439 PPPS, SUBSEQ VISIT: HCPCS | Performed by: FAMILY MEDICINE

## 2024-06-17 PROCEDURE — 99214 OFFICE O/P EST MOD 30 MIN: CPT | Performed by: FAMILY MEDICINE

## 2024-06-17 PROCEDURE — 3017F COLORECTAL CA SCREEN DOC REV: CPT | Performed by: FAMILY MEDICINE

## 2024-06-17 PROCEDURE — G8399 PT W/DXA RESULTS DOCUMENT: HCPCS | Performed by: FAMILY MEDICINE

## 2024-06-17 PROCEDURE — 1123F ACP DISCUSS/DSCN MKR DOCD: CPT | Performed by: FAMILY MEDICINE

## 2024-06-17 PROCEDURE — G8417 CALC BMI ABV UP PARAM F/U: HCPCS | Performed by: FAMILY MEDICINE

## 2024-06-17 RX ORDER — EPINEPHRINE 0.3 MG/.3ML
0.3 INJECTION SUBCUTANEOUS ONCE
Qty: 0.3 ML | Refills: 0 | Status: SHIPPED | OUTPATIENT
Start: 2024-06-17 | End: 2024-06-17

## 2024-06-17 RX ORDER — CITALOPRAM 40 MG/1
40 TABLET ORAL DAILY
Qty: 90 TABLET | Refills: 1 | Status: SHIPPED | OUTPATIENT
Start: 2024-06-17

## 2024-06-17 SDOH — ECONOMIC STABILITY: FOOD INSECURITY: WITHIN THE PAST 12 MONTHS, YOU WORRIED THAT YOUR FOOD WOULD RUN OUT BEFORE YOU GOT MONEY TO BUY MORE.: NEVER TRUE

## 2024-06-17 SDOH — ECONOMIC STABILITY: FOOD INSECURITY: WITHIN THE PAST 12 MONTHS, THE FOOD YOU BOUGHT JUST DIDN'T LAST AND YOU DIDN'T HAVE MONEY TO GET MORE.: NEVER TRUE

## 2024-06-17 SDOH — ECONOMIC STABILITY: HOUSING INSECURITY
IN THE LAST 12 MONTHS, WAS THERE A TIME WHEN YOU DID NOT HAVE A STEADY PLACE TO SLEEP OR SLEPT IN A SHELTER (INCLUDING NOW)?: NO

## 2024-06-17 SDOH — ECONOMIC STABILITY: INCOME INSECURITY: HOW HARD IS IT FOR YOU TO PAY FOR THE VERY BASICS LIKE FOOD, HOUSING, MEDICAL CARE, AND HEATING?: NOT HARD AT ALL

## 2024-06-17 ASSESSMENT — PATIENT HEALTH QUESTIONNAIRE - PHQ9
SUM OF ALL RESPONSES TO PHQ QUESTIONS 1-9: 6
SUM OF ALL RESPONSES TO PHQ QUESTIONS 1-9: 6
9. THOUGHTS THAT YOU WOULD BE BETTER OFF DEAD, OR OF HURTING YOURSELF: NOT AT ALL
8. MOVING OR SPEAKING SO SLOWLY THAT OTHER PEOPLE COULD HAVE NOTICED. OR THE OPPOSITE, BEING SO FIGETY OR RESTLESS THAT YOU HAVE BEEN MOVING AROUND A LOT MORE THAN USUAL: NOT AT ALL
SUM OF ALL RESPONSES TO PHQ QUESTIONS 1-9: 6
5. POOR APPETITE OR OVEREATING: SEVERAL DAYS
1. LITTLE INTEREST OR PLEASURE IN DOING THINGS: SEVERAL DAYS
SUM OF ALL RESPONSES TO PHQ9 QUESTIONS 1 & 2: 1
7. TROUBLE CONCENTRATING ON THINGS, SUCH AS READING THE NEWSPAPER OR WATCHING TELEVISION: SEVERAL DAYS
10. IF YOU CHECKED OFF ANY PROBLEMS, HOW DIFFICULT HAVE THESE PROBLEMS MADE IT FOR YOU TO DO YOUR WORK, TAKE CARE OF THINGS AT HOME, OR GET ALONG WITH OTHER PEOPLE: NOT DIFFICULT AT ALL
SUM OF ALL RESPONSES TO PHQ QUESTIONS 1-9: 6
4. FEELING TIRED OR HAVING LITTLE ENERGY: SEVERAL DAYS
6. FEELING BAD ABOUT YOURSELF - OR THAT YOU ARE A FAILURE OR HAVE LET YOURSELF OR YOUR FAMILY DOWN: NOT AT ALL
2. FEELING DOWN, DEPRESSED OR HOPELESS: NOT AT ALL
3. TROUBLE FALLING OR STAYING ASLEEP: MORE THAN HALF THE DAYS

## 2024-06-17 ASSESSMENT — ENCOUNTER SYMPTOMS
COUGH: 0
ABDOMINAL PAIN: 0

## 2024-06-17 ASSESSMENT — LIFESTYLE VARIABLES
HOW OFTEN DO YOU HAVE A DRINK CONTAINING ALCOHOL: 2-3 TIMES A WEEK
HOW MANY STANDARD DRINKS CONTAINING ALCOHOL DO YOU HAVE ON A TYPICAL DAY: 1 OR 2

## 2024-06-17 NOTE — PROGRESS NOTES
Successful venipuncture in patient's r ac X 1 sticks.  The patient tolerated this procedure w/o c/o pain or discomfort.\"Have you been to the ER, urgent care clinic since your last visit?  Hospitalized since your last visit?\"    NO    “Have you seen or consulted any other health care providers outside of [facility] since your last visit?”    NO    Have you had a mammogram?”   NO    Date of last Mammogram: 11/23/2021             Click Here for Release of Records Request

## 2024-06-17 NOTE — PROGRESS NOTES
Kayley Foote is a 73 y.o. female who presents to the office today with the following:  Chief Complaint   Patient presents with    Medicare AWV            HPI  HM reviewed    Needs Epi pen d/t bee sting allergies    Hx of Depression now d/t loss of son  Previously on it for anxiety  No sy now  Still would like to cont it  No SE with it    Hyperlipidemia  Not fasting today  Not on meds  Watching diet some, does not avoid Chol foods but not eating a lot of it  Would like to have it rechecked    Review of Systems   Constitutional:  Negative for unexpected weight change.   HENT:  Negative for congestion.    Respiratory:  Negative for cough.    Cardiovascular:  Negative for chest pain.   Gastrointestinal:  Negative for abdominal pain.   Genitourinary:  Negative for dysuria.   Neurological:  Negative for dizziness and headaches.   Psychiatric/Behavioral:  Negative for dysphoric mood. The patient is not nervous/anxious.        See HPI.    Past Medical History:   Diagnosis Date    Anxiety     Depression     GERD (gastroesophageal reflux disease)     Hiatal hernia     resolved    Hyperlipidemia     Hypothyroidism     resolved       Past Surgical History:   Procedure Laterality Date    APPENDECTOMY      CATARACT REMOVAL  01/2017    right eye    CATARACT REMOVAL Left 2020    COLONOSCOPY  2007,2014    q5y    COLONOSCOPY      ORTHOPEDIC SURGERY Right 2012    Rotator cuff    OTHER SURGICAL HISTORY  1/16    stress echo    OTHER SURGICAL HISTORY  07/08/2016    gastric sleeve    YENNIFER AND BSO (CERVIX REMOVED)         Allergies   Allergen Reactions    Bee Venom Swelling     Other reaction(s): Unknown (comments)    Codeine Other (See Comments)     Extreme fatigue, very sleepy, not function well       Current Outpatient Medications   Medication Sig Dispense Refill    ELDERBERRY PO Take by mouth      Multiple Vitamins-Minerals (PRESERVISION AREDS 2 PO) Take by mouth      EPINEPHrine (EPIPEN 2-JAYA) 0.3 MG/0.3ML SOAJ injection

## 2024-06-18 LAB
ALBUMIN SERPL-MCNC: 3.5 G/DL (ref 3.5–5)
ALBUMIN/GLOB SERPL: 1.1 (ref 1.1–2.2)
ALP SERPL-CCNC: 93 U/L (ref 45–117)
ALT SERPL-CCNC: 19 U/L (ref 12–78)
ANION GAP SERPL CALC-SCNC: 4 MMOL/L (ref 5–15)
AST SERPL-CCNC: 23 U/L (ref 15–37)
BASOPHILS # BLD: 0.1 K/UL (ref 0–0.1)
BASOPHILS NFR BLD: 1 % (ref 0–1)
BILIRUB SERPL-MCNC: 0.3 MG/DL (ref 0.2–1)
BUN SERPL-MCNC: 19 MG/DL (ref 6–20)
BUN/CREAT SERPL: 17 (ref 12–20)
CALCIUM SERPL-MCNC: 9.3 MG/DL (ref 8.5–10.1)
CHLORIDE SERPL-SCNC: 107 MMOL/L (ref 97–108)
CHOLEST SERPL-MCNC: 243 MG/DL
CO2 SERPL-SCNC: 27 MMOL/L (ref 21–32)
CREAT SERPL-MCNC: 1.12 MG/DL (ref 0.55–1.02)
DIFFERENTIAL METHOD BLD: NORMAL
EOSINOPHIL # BLD: 0.2 K/UL (ref 0–0.4)
EOSINOPHIL NFR BLD: 3 % (ref 0–7)
ERYTHROCYTE [DISTWIDTH] IN BLOOD BY AUTOMATED COUNT: 13.3 % (ref 11.5–14.5)
GLOBULIN SER CALC-MCNC: 3.2 G/DL (ref 2–4)
GLUCOSE SERPL-MCNC: 94 MG/DL (ref 65–100)
HCT VFR BLD AUTO: 39.9 % (ref 35–47)
HDLC SERPL-MCNC: 61 MG/DL
HDLC SERPL: 4 (ref 0–5)
HGB BLD-MCNC: 13 G/DL (ref 11.5–16)
IMM GRANULOCYTES # BLD AUTO: 0 K/UL (ref 0–0.04)
IMM GRANULOCYTES NFR BLD AUTO: 0 % (ref 0–0.5)
LDLC SERPL CALC-MCNC: 151.6 MG/DL (ref 0–100)
LYMPHOCYTES # BLD: 2.3 K/UL (ref 0.8–3.5)
LYMPHOCYTES NFR BLD: 33 % (ref 12–49)
MCH RBC QN AUTO: 30.8 PG (ref 26–34)
MCHC RBC AUTO-ENTMCNC: 32.6 G/DL (ref 30–36.5)
MCV RBC AUTO: 94.5 FL (ref 80–99)
MONOCYTES # BLD: 0.5 K/UL (ref 0–1)
MONOCYTES NFR BLD: 7 % (ref 5–13)
NEUTS SEG # BLD: 3.9 K/UL (ref 1.8–8)
NEUTS SEG NFR BLD: 56 % (ref 32–75)
NRBC # BLD: 0 K/UL (ref 0–0.01)
NRBC BLD-RTO: 0 PER 100 WBC
PLATELET # BLD AUTO: 293 K/UL (ref 150–400)
PMV BLD AUTO: 11.1 FL (ref 8.9–12.9)
POTASSIUM SERPL-SCNC: 5.3 MMOL/L (ref 3.5–5.1)
PROT SERPL-MCNC: 6.7 G/DL (ref 6.4–8.2)
RBC # BLD AUTO: 4.22 M/UL (ref 3.8–5.2)
SODIUM SERPL-SCNC: 138 MMOL/L (ref 136–145)
TRIGL SERPL-MCNC: 152 MG/DL
VLDLC SERPL CALC-MCNC: 30.4 MG/DL
WBC # BLD AUTO: 7 K/UL (ref 3.6–11)

## 2024-06-21 ENCOUNTER — NURSE ONLY (OUTPATIENT)
Dept: FAMILY MEDICINE CLINIC | Age: 74
End: 2024-06-21
Payer: MEDICARE

## 2024-06-21 DIAGNOSIS — E87.5 HYPERKALEMIA: ICD-10-CM

## 2024-06-21 PROCEDURE — 36415 COLL VENOUS BLD VENIPUNCTURE: CPT | Performed by: FAMILY MEDICINE

## 2024-06-22 LAB — POTASSIUM SERPL-SCNC: 4.4 MMOL/L (ref 3.5–5.1)

## 2024-12-05 DIAGNOSIS — F33.8 OTHER RECURRENT DEPRESSIVE DISORDERS (HCC): ICD-10-CM

## 2024-12-05 DIAGNOSIS — F41.9 ANXIETY: ICD-10-CM

## 2024-12-05 RX ORDER — CITALOPRAM HYDROBROMIDE 40 MG/1
40 TABLET ORAL DAILY
Qty: 90 TABLET | Refills: 1 | Status: SHIPPED | OUTPATIENT
Start: 2024-12-05

## 2024-12-05 NOTE — TELEPHONE ENCOUNTER
Patient requesting refill on     Requested Prescriptions     Pending Prescriptions Disp Refills    citalopram (CELEXA) 40 MG tablet [Pharmacy Med Name: CITALOPRAM HBR 40 MG TABLET] 90 tablet 1     Sig: take 1 tablet by mouth once daily        Last OV 6/17/2024

## 2025-03-24 ENCOUNTER — COMMUNITY OUTREACH (OUTPATIENT)
Dept: FAMILY MEDICINE CLINIC | Age: 75
End: 2025-03-24

## 2025-07-18 ENCOUNTER — OFFICE VISIT (OUTPATIENT)
Dept: FAMILY MEDICINE CLINIC | Age: 75
End: 2025-07-18
Payer: MEDICARE

## 2025-07-18 VITALS
OXYGEN SATURATION: 97 % | SYSTOLIC BLOOD PRESSURE: 117 MMHG | WEIGHT: 181.13 LBS | TEMPERATURE: 97.3 F | HEART RATE: 73 BPM | HEIGHT: 63 IN | BODY MASS INDEX: 32.09 KG/M2 | RESPIRATION RATE: 16 BRPM | DIASTOLIC BLOOD PRESSURE: 73 MMHG

## 2025-07-18 DIAGNOSIS — F33.8 OTHER RECURRENT DEPRESSIVE DISORDERS: ICD-10-CM

## 2025-07-18 DIAGNOSIS — Z00.00 MEDICARE ANNUAL WELLNESS VISIT, SUBSEQUENT: Primary | ICD-10-CM

## 2025-07-18 DIAGNOSIS — Z71.89 ACP (ADVANCE CARE PLANNING): ICD-10-CM

## 2025-07-18 DIAGNOSIS — E87.5 HYPERKALEMIA: ICD-10-CM

## 2025-07-18 PROCEDURE — 36415 COLL VENOUS BLD VENIPUNCTURE: CPT | Performed by: FAMILY MEDICINE

## 2025-07-18 PROCEDURE — 3017F COLORECTAL CA SCREEN DOC REV: CPT | Performed by: FAMILY MEDICINE

## 2025-07-18 PROCEDURE — 1126F AMNT PAIN NOTED NONE PRSNT: CPT | Performed by: FAMILY MEDICINE

## 2025-07-18 PROCEDURE — G0439 PPPS, SUBSEQ VISIT: HCPCS | Performed by: FAMILY MEDICINE

## 2025-07-18 PROCEDURE — 1159F MED LIST DOCD IN RCRD: CPT | Performed by: FAMILY MEDICINE

## 2025-07-18 PROCEDURE — 1123F ACP DISCUSS/DSCN MKR DOCD: CPT | Performed by: FAMILY MEDICINE

## 2025-07-18 PROCEDURE — 99497 ADVNCD CARE PLAN 30 MIN: CPT | Performed by: FAMILY MEDICINE

## 2025-07-18 PROCEDURE — 1160F RVW MEDS BY RX/DR IN RCRD: CPT | Performed by: FAMILY MEDICINE

## 2025-07-18 SDOH — ECONOMIC STABILITY: FOOD INSECURITY: WITHIN THE PAST 12 MONTHS, THE FOOD YOU BOUGHT JUST DIDN'T LAST AND YOU DIDN'T HAVE MONEY TO GET MORE.: NEVER TRUE

## 2025-07-18 SDOH — ECONOMIC STABILITY: FOOD INSECURITY: WITHIN THE PAST 12 MONTHS, YOU WORRIED THAT YOUR FOOD WOULD RUN OUT BEFORE YOU GOT MONEY TO BUY MORE.: NEVER TRUE

## 2025-07-18 ASSESSMENT — PATIENT HEALTH QUESTIONNAIRE - PHQ9
5. POOR APPETITE OR OVEREATING: NOT AT ALL
3. TROUBLE FALLING OR STAYING ASLEEP: MORE THAN HALF THE DAYS
4. FEELING TIRED OR HAVING LITTLE ENERGY: SEVERAL DAYS
8. MOVING OR SPEAKING SO SLOWLY THAT OTHER PEOPLE COULD HAVE NOTICED. OR THE OPPOSITE, BEING SO FIGETY OR RESTLESS THAT YOU HAVE BEEN MOVING AROUND A LOT MORE THAN USUAL: NOT AT ALL
2. FEELING DOWN, DEPRESSED OR HOPELESS: NOT AT ALL
10. IF YOU CHECKED OFF ANY PROBLEMS, HOW DIFFICULT HAVE THESE PROBLEMS MADE IT FOR YOU TO DO YOUR WORK, TAKE CARE OF THINGS AT HOME, OR GET ALONG WITH OTHER PEOPLE: SOMEWHAT DIFFICULT
1. LITTLE INTEREST OR PLEASURE IN DOING THINGS: NOT AT ALL
SUM OF ALL RESPONSES TO PHQ QUESTIONS 1-9: 3
6. FEELING BAD ABOUT YOURSELF - OR THAT YOU ARE A FAILURE OR HAVE LET YOURSELF OR YOUR FAMILY DOWN: NOT AT ALL
9. THOUGHTS THAT YOU WOULD BE BETTER OFF DEAD, OR OF HURTING YOURSELF: NOT AT ALL
7. TROUBLE CONCENTRATING ON THINGS, SUCH AS READING THE NEWSPAPER OR WATCHING TELEVISION: NOT AT ALL
SUM OF ALL RESPONSES TO PHQ QUESTIONS 1-9: 3

## 2025-07-18 ASSESSMENT — LIFESTYLE VARIABLES
HOW MANY STANDARD DRINKS CONTAINING ALCOHOL DO YOU HAVE ON A TYPICAL DAY: 1 OR 2
HOW OFTEN DO YOU HAVE A DRINK CONTAINING ALCOHOL: MONTHLY OR LESS

## 2025-07-18 NOTE — PATIENT INSTRUCTIONS
cholesterol. If you think you may have a problem with alcohol or drug use, talk to your doctor.   Medicines    Take your medicines exactly as prescribed. Call your doctor if you think you are having a problem with your medicine.     If your doctor recommends aspirin, take the amount directed each day. Make sure you take aspirin and not another kind of pain reliever, such as acetaminophen (Tylenol).   When should you call for help?   Call 911 if you have symptoms of a heart attack. These may include:    Chest pain or pressure, or a strange feeling in the chest.     Sweating.     Shortness of breath.     Pain, pressure, or a strange feeling in the back, neck, jaw, or upper belly or in one or both shoulders or arms.     Lightheadedness or sudden weakness.     A fast or irregular heartbeat.   After you call 911, the  may tell you to chew 1 adult-strength or 2 to 4 low-dose aspirin. Wait for an ambulance. Do not try to drive yourself.  Watch closely for changes in your health, and be sure to contact your doctor if you have any problems.  Where can you learn more?  Go to https://www.[employer].net/patientEd and enter F075 to learn more about \"A Healthy Heart: Care Instructions.\"  Current as of: July 31, 2024  Content Version: 14.5  © 3557-6128 [employer].   Care instructions adapted under license by [employer]. If you have questions about a medical condition or this instruction, always ask your healthcare professional. [employer] disclaims any warranty or liability for your use of this information.    Personalized Preventive Plan for Kayley Foote - 7/18/2025  Medicare offers a range of preventive health benefits. Some of the tests and screenings are paid in full while other may be subject to a deductible, co-insurance, and/or copay.  Some of these benefits include a comprehensive review of your medical history including lifestyle, illnesses that may run in your family, and various

## 2025-07-18 NOTE — ACP (ADVANCE CARE PLANNING)
Advance Care Planning     Advance Care Planning (ACP) Physician/NP/PA Conversation    Date of Conversation: 7/18/2025  Conducted with: Patient with Decision Making Capacity  Other persons present: None    Healthcare Decision Maker:   Primary Decision Maker: Isreal Foote - Spouse - [phone]     Today we discussed [facility] AMD documentation    Care Preferences:    Hospitalization:  \"If your health worsens and it becomes clear that your chance of recovery is unlikely, what would be your preference regarding hospitalization?\"      Ventilation:  \"If you were unable to breath on your own and your chance of recovery was unlikely, what would be your preference about the use of a ventilator (breathing machine) if it was available to you?\"      Resuscitation:  \"In the event your heart stopped as a result of an underlying serious health condition, would you want attempts made to restart your heart, or would you prefer a natural death?\"          Conversation Outcomes / Follow-Up Plan:  ACP in process - information provided, considering goals and options  Reviewed DNR/DNI and patient     Length of Voluntary ACP Conversation in minutes:  16 minutes    Jose Mc MD

## 2025-07-18 NOTE — PROGRESS NOTES
Have you been to the ER, urgent care clinic since your last visit?  Hospitalized since your last visit?   NO    Have you seen or consulted any other health care providers outside our system since your last visit?   Yes - 7/1/25  -  [facility]

## 2025-07-18 NOTE — PROGRESS NOTES
Medicare Annual Wellness Visit    Kayley Foote is here for Medicare AWV    Assessment & Plan   Medicare annual wellness visit, subsequent     No follow-ups on file.     Subjective       Patient's complete Health Risk Assessment and screening values have been reviewed and are found in Flowsheets. The following problems were reviewed today and where indicated follow up appointments were made and/or referrals ordered.    Positive Risk Factor Screenings with Interventions:              Inactivity:  On average, how many days per week do you engage in moderate to strenuous exercise (like a brisk walk)?: 0 days (!) Abnormal  On average, how many minutes do you engage in exercise at this level?: 0 min  Interventions:  Patient declined any further interventions or treatment     Abnormal BMI (obese):  Body mass index is 32.08 kg/m². (!) Abnormal  Interventions:  exercise for at least 150 minutes/week              Advanced Directives:  Do you have a Living Will?: (!) No    Intervention:  see ACP note    Advance Care Planning   Discussed the patient’s choices for care and treatment preferences in case of a health event that adversely affects decision-making abilities or is life-limiting. Recommended the patient document care preferences in state-specific advance directives. Also reviewed the process of designating a trusted capable adult as an Agent (or Health Care Power of ) to make health care decisions for the patient if the patient becomes unable due to incapacity. Patient was asked to complete advance directive forms, if not already done, and to provide a dated, signed and witnessed (or notarized) copy, per the forms' requirements, to the practice office.    Time spent (minutes): 16 minutes                    Objective   Vitals:    07/18/25 1359   TempSrc: Oral   Weight: 82.2 kg (181 lb 2 oz)   Height: 1.6 m (5' 3\")      Body mass index is 32.08 kg/m².                    Allergies   Allergen Reactions    Bee

## 2025-07-19 LAB
ALBUMIN SERPL-MCNC: 3.5 G/DL (ref 3.5–5)
ALBUMIN/GLOB SERPL: 1.2 (ref 1.1–2.2)
ALP SERPL-CCNC: 87 U/L (ref 45–117)
ALT SERPL-CCNC: 17 U/L (ref 12–78)
ANION GAP SERPL CALC-SCNC: 6 MMOL/L (ref 2–12)
AST SERPL-CCNC: 19 U/L (ref 15–37)
BASOPHILS # BLD: 0.08 K/UL (ref 0–0.1)
BASOPHILS NFR BLD: 1.1 % (ref 0–1)
BILIRUB SERPL-MCNC: 0.3 MG/DL (ref 0.2–1)
BUN SERPL-MCNC: 18 MG/DL (ref 6–20)
BUN/CREAT SERPL: 16 (ref 12–20)
CALCIUM SERPL-MCNC: 8.9 MG/DL (ref 8.5–10.1)
CHLORIDE SERPL-SCNC: 106 MMOL/L (ref 97–108)
CO2 SERPL-SCNC: 26 MMOL/L (ref 21–32)
CREAT SERPL-MCNC: 1.11 MG/DL (ref 0.55–1.02)
DIFFERENTIAL METHOD BLD: ABNORMAL
EOSINOPHIL # BLD: 0.25 K/UL (ref 0–0.4)
EOSINOPHIL NFR BLD: 3.4 % (ref 0–7)
ERYTHROCYTE [DISTWIDTH] IN BLOOD BY AUTOMATED COUNT: 12.7 % (ref 11.5–14.5)
GLOBULIN SER CALC-MCNC: 2.9 G/DL (ref 2–4)
GLUCOSE SERPL-MCNC: 101 MG/DL (ref 65–100)
HCT VFR BLD AUTO: 41.7 % (ref 35–47)
HGB BLD-MCNC: 13 G/DL (ref 11.5–16)
IMM GRANULOCYTES # BLD AUTO: 0.01 K/UL (ref 0–0.04)
IMM GRANULOCYTES NFR BLD AUTO: 0.1 % (ref 0–0.5)
LYMPHOCYTES # BLD: 2.11 K/UL (ref 0.8–3.5)
LYMPHOCYTES NFR BLD: 28.3 % (ref 12–49)
MCH RBC QN AUTO: 30.3 PG (ref 26–34)
MCHC RBC AUTO-ENTMCNC: 31.2 G/DL (ref 30–36.5)
MCV RBC AUTO: 97.2 FL (ref 80–99)
MONOCYTES # BLD: 0.57 K/UL (ref 0–1)
MONOCYTES NFR BLD: 7.6 % (ref 5–13)
NEUTS SEG # BLD: 4.44 K/UL (ref 1.8–8)
NEUTS SEG NFR BLD: 59.5 % (ref 32–75)
NRBC # BLD: 0 K/UL (ref 0–0.01)
NRBC BLD-RTO: 0 PER 100 WBC
PLATELET # BLD AUTO: 299 K/UL (ref 150–400)
PMV BLD AUTO: 11.8 FL (ref 8.9–12.9)
POTASSIUM SERPL-SCNC: 4.6 MMOL/L (ref 3.5–5.1)
PROT SERPL-MCNC: 6.4 G/DL (ref 6.4–8.2)
RBC # BLD AUTO: 4.29 M/UL (ref 3.8–5.2)
SODIUM SERPL-SCNC: 138 MMOL/L (ref 136–145)
WBC # BLD AUTO: 7.5 K/UL (ref 3.6–11)